# Patient Record
Sex: MALE | Race: WHITE | Employment: FULL TIME | URBAN - METROPOLITAN AREA
[De-identification: names, ages, dates, MRNs, and addresses within clinical notes are randomized per-mention and may not be internally consistent; named-entity substitution may affect disease eponyms.]

---

## 2017-02-27 ENCOUNTER — OFFICE VISIT (OUTPATIENT)
Dept: PODIATRY | Facility: CLINIC | Age: 57
End: 2017-02-27
Payer: COMMERCIAL

## 2017-02-27 DIAGNOSIS — M79.675 PAIN OF TOE OF LEFT FOOT: ICD-10-CM

## 2017-02-27 DIAGNOSIS — B35.1 ONYCHOMYCOSIS: Primary | ICD-10-CM

## 2017-02-27 PROCEDURE — 99213 OFFICE O/P EST LOW 20 MIN: CPT | Mod: 25 | Performed by: PODIATRIST

## 2017-02-27 PROCEDURE — 11750 EXCISION NAIL&NAIL MATRIX: CPT | Mod: TA | Performed by: PODIATRIST

## 2017-02-27 NOTE — PROGRESS NOTES
"PATIENT HISTORY:  Pb Vincent is a 56 year old male who presents to clinic for left hallux toenail pain.  Present for years.  Worse with certain shoes.  4/10 pain.  He is considering removal.  Denies other medial issues.  Works in computer programming.  No other treatments reported.       EXAM:Vitals: Ht 5' 10.75\" (1.797 m)  Wt 226 lb 6.4 oz (102.7 kg)  BMI 31.8 kg/m2  BMI= Body mass index is 31.8 kg/(m^2).    General appearance: Patient is alert and fully cooperative with history & exam.  No sign of distress is noted during the visit.     Dermatologic: Left hallux nail thickened, dystrophic, discolored. No paronychia or evidence of soft tissue infection is noted.     Vascular: DP & PT pulses are intact & regular on the left.  No significant edema or varicosities noted.  CFT and skin temperature are normal.     Neurologic: Lower extremity sensation is intact to light touch.  No evidence of weakness or contracture in the lower extremities.  No evidence of neuropathy.     Musculoskeletal: Patient is ambulatory without assistive device or brace.  No gross ankle deformity noted.  No foot or ankle joint effusion is noted.     ASSESSMENT: Left hallux onychomycosis, pain     PLAN:  Reviewed patient's chart in epic.  Discussed condition and treatment options including pros and cons.    Discussed causes of nail fungus.  Discussed treatment options with patient and explained that there isn't one treatment that is 100% effective.  Debridement is an option.  Discussed oral lamisil which is the most effective at about 70% but can have liver effects.  Discussed over the counter antifungal creams.  Explained that these are less effective and need to be applied twice a day for about 3-4 months.  Also talked about prescription topicals, which have similar efficacy.  Also discussed that if there was damage to the nail and the nail is now dystrophic that none of the above is going to change the nail.  Discussed that if it becomes " painful, we can remove the nail in clinic.  The patient elected to undergo total avulsion, permanent.  Discussed risk of infection, nail regrowth, slow wound healing.    Procedure:  In addition to office visit.  After consent, the left 1st toe was anesthetized with 5cc's of 1% lidocaine plain after alcohol prep. Betadine prep performed.  A tourniquet was applied to the toe. Using sterile instrumentation, the nail plate was raised and avulsed.  Difficult to remove given nail changes.  Three 30 second applications of phenol were applied to the nail bed and nail matrix.  The area was then flushed with copious amounts of alcohol.  Bacitracin was applied to the nail bed.  The tourniquet was removed and a hyperemic response was noted.  Bandage was applied to the toe.  The patient tolerated the procedure and anesthesia well.    Post op instructions provided and discussed with pt.  F/u in 2 wks.        Lamont England DPM, FACFAS

## 2017-02-27 NOTE — MR AVS SNAPSHOT
After Visit Summary   2/27/2017    Pb Vincent    MRN: [id number]           Patient Information     Date Of Birth          1960        Visit Information        Provider Department      2/27/2017 3:00 PM Lamont England DPM [facility]        Today's Diagnoses     Onychomycosis    -  1    Pain of toe of left foot          Care Instructions    TOENAIL POSTOPERATIVE INSTRUCTIONS   (Nail avulsion or chemical matrixectomy)   1 Go directly home and elevate the affected foot on one or two pillows for the remainder of the day/evening. Your toe may stay numb for the next 2-8 hours.   2 Take Tylenol, ibuprofen or another anti-inflammatory as needed for pain.   3 Take antibiotic if that has been prescribed. Take the entire prescribed antibiotic even if your symptoms have improved.   4 Keep dressing dry and intact the day of the procedure. The morning after the procedure, remove entire dressing and soak/wash the affected area in warm water (you may add Epsom salt) for 5 to 10 minutes. Do this twice a day for 2-4 weeks (6-8 weeks if you had phenol) (you may count showering/bathing as one soak).  After soaks, pat the area dry and then allow to airdry for a few minutes. Apply antibiotic ointment to the area and cover with 2 X 2 gauze and paper tape or a band-aid.  5 May walk and pursue everyday activities as tolerated with either an open toe shoe or cut-out shoe as needed. May wear regular shoes if no pain is noted.  6 Watch for any signs and symptoms of infection such as: redness, red streaks going up the foot/leg, swelling, pus or foul odor. Those that have had the phenol procedure, the toe will drain longer and will look like it is infected because it is a chemical burn.  Please call with questions.      NAIL FUNGUS / ONYCHOMYCOSIS   Nail fungus is not a hygiene problem and will not likely lead to significant medical   problems. The nails may get thick causing pain and possibly  local skin infection.   Treatments include debridement (trimming), oral antifungals, topical antifungals and complete removal of the nail. Most fungal nails are not treated.   Topicals such as tea tree oil can be helpful for surface fungus and may, at best, limit   progression. Over the counter creams (such as Lamisil) can also be used however, their effectiveness is also quite low.  Topical treatment with Pen lac is expensive and often not covered by insurance. Pen lac has an approximate 8% success rate. Topical therapy recommendations is to apply twice a day for at least 3-4 months as it takes 9 months for new nail to grow out.    Experts suggest soaking your feet for 15 to 20 minutes in a mixture of 1 cup vinegar to 4 cups warm water. Be sure to rinse well and pat your feet dry when you're done. You can soak your feet like this daily. But if your skin becomes irritated, try soaking only two to three times a week. Vicks VapoRub. As with vinegar, there have been no controlled clinical trials to assess the effectiveness of Vicks VapoRub on nail fungus, but there have been numerous anecdotal reports that it works. There's no consensus on how often to apply this product, so check with your doctor before using it on your nails.     Oral therapies include Sporanox and Lamisil. Oral therapies are also expensive and not very effective. Side effects such as liver disease are the main concern. Return of fungus is common even if the treatment worked.           Follow-ups after your visit        Who to contact     If you have questions or need follow up information about today's clinic visit or your schedule please contact [facility] directly at [phone].  Normal or non-critical lab and imaging results will be communicated to you by MyChart, letter or phone within 4 business days after the clinic has received the results. If you do not hear from us within 7 days, please contact the clinic through  "MyChart or phone. If you have a critical or abnormal lab result, we will notify you by phone as soon as possible.  Submit refill requests through [employer] or call your pharmacy and they will forward the refill request to us. Please allow 3 business days for your refill to be completed.          Additional Information About Your Visit        [employer] Information     [employer] gives you secure access to your electronic health record. If you see a primary care provider, you can also send messages to your care team and make appointments. If you have questions, please call your primary care clinic.  If you do not have a primary care provider, please call [phone] and they will assist you.        Care EveryWhere ID     This is your Care EveryWhere ID. This could be used by other organizations to access your [place] medical records  [id number]        Your Vitals Were     Height BMI (Body Mass Index)                5' 10.75\" (1.797 m) 31.8 kg/m2           Blood Pressure from Last 3 Encounters:   06/14/16 129/82   03/23/16 117/76   06/12/15 134/82    Weight from Last 3 Encounters:   02/27/17 226 lb 6.4 oz (102.7 kg)   06/14/16 233 lb (105.7 kg)   03/23/16 228 lb (103.4 kg)              Today, you had the following     No orders found for display       Primary Care Provider Office Phone # Fax #    Jj Monreal [phone]       [address]  [facility] [address]        Thank you!     Thank you for choosing [facility]  for your care. Our goal is always to provide you with excellent care. Hearing back from our patients is one way we can continue to improve our services. Please take a few minutes to complete the written survey that you may receive in the mail after your visit with us. Thank you!             Your Updated Medication List - Protect others around you: Learn how to safely use, store and throw away your medicines at www.disposemymeds.org.          This " list is accurate as of: 2/27/17  3:48 PM.  Always use your most recent med list.                   Brand Name Dispense Instructions for use    acyclovir 800 MG tablet    ZOVIRAX    50 tablet    Take 1 tablet (800 mg) by mouth 5 times daily for 10 days       ASPIRIN EC PO      Unknown dose       PARoxetine 20 MG tablet    PAXIL    90 tablet    Take 1 tablet (20 mg) by mouth daily

## 2017-02-27 NOTE — PATIENT INSTRUCTIONS
TOENAIL POSTOPERATIVE INSTRUCTIONS   (Nail avulsion or chemical matrixectomy)   1 Go directly home and elevate the affected foot on one or two pillows for the remainder of the day/evening. Your toe may stay numb for the next 2-8 hours.   2 Take Tylenol, ibuprofen or another anti-inflammatory as needed for pain.   3 Take antibiotic if that has been prescribed. Take the entire prescribed antibiotic even if your symptoms have improved.   4 Keep dressing dry and intact the day of the procedure. The morning after the procedure, remove entire dressing and soak/wash the affected area in warm water (you may add Epsom salt) for 5 to 10 minutes. Do this twice a day for 2-4 weeks (6-8 weeks if you had phenol) (you may count showering/bathing as one soak).  After soaks, pat the area dry and then allow to airdry for a few minutes. Apply antibiotic ointment to the area and cover with 2 X 2 gauze and paper tape or a band-aid.  5 May walk and pursue everyday activities as tolerated with either an open toe shoe or cut-out shoe as needed. May wear regular shoes if no pain is noted.  6 Watch for any signs and symptoms of infection such as: redness, red streaks going up the foot/leg, swelling, pus or foul odor. Those that have had the phenol procedure, the toe will drain longer and will look like it is infected because it is a chemical burn.  Please call with questions.      NAIL FUNGUS / ONYCHOMYCOSIS   Nail fungus is not a hygiene problem and will not likely lead to significant medical   problems. The nails may get thick causing pain and possibly local skin infection.   Treatments include debridement (trimming), oral antifungals, topical antifungals and complete removal of the nail. Most fungal nails are not treated.   Topicals such as tea tree oil can be helpful for surface fungus and may, at best, limit   progression. Over the counter creams (such as Lamisil) can also be used however, their effectiveness is also quite low.  Topical  treatment with Pen lac is expensive and often not covered by insurance. Pen lac has an approximate 8% success rate. Topical therapy recommendations is to apply twice a day for at least 3-4 months as it takes 9 months for new nail to grow out.    Experts suggest soaking your feet for 15 to 20 minutes in a mixture of 1 cup vinegar to 4 cups warm water. Be sure to rinse well and pat your feet dry when you're done. You can soak your feet like this daily. But if your skin becomes irritated, try soaking only two to three times a week. Vicks VapoRub. As with vinegar, there have been no controlled clinical trials to assess the effectiveness of Vicks VapoRub on nail fungus, but there have been numerous anecdotal reports that it works. There's no consensus on how often to apply this product, so check with your doctor before using it on your nails.     Oral therapies include Sporanox and Lamisil. Oral therapies are also expensive and not very effective. Side effects such as liver disease are the main concern. Return of fungus is common even if the treatment worked.

## 2017-02-28 VITALS
WEIGHT: 226.4 LBS | DIASTOLIC BLOOD PRESSURE: 82 MMHG | BODY MASS INDEX: 31.69 KG/M2 | SYSTOLIC BLOOD PRESSURE: 131 MMHG | HEIGHT: 71 IN

## 2017-02-28 NOTE — NURSING NOTE
"Chief Complaint   Patient presents with     Ingrown Toenail     L 1st toenail ingrown       Initial /82 (BP Location: Right arm, Patient Position: Chair, Cuff Size: Adult Large)  Ht 5' 10.75\" (1.797 m)  Wt 226 lb 6.4 oz (102.7 kg)  BMI 31.8 kg/m2 Estimated body mass index is 31.8 kg/(m^2) as calculated from the following:    Height as of this encounter: 5' 10.75\" (1.797 m).    Weight as of this encounter: 226 lb 6.4 oz (102.7 kg).  Medication Reconciliation: boni Salgado MA February 28, 2017 10:28 AM      "

## 2017-02-28 NOTE — PROGRESS NOTES
Weight management plan: Patient was referred to their PCP to discuss a diet and exercise plan.     MORGAN Salgado MA February 28, 2017 10:28 AM

## 2017-07-21 ENCOUNTER — OFFICE VISIT (OUTPATIENT)
Dept: FAMILY MEDICINE | Facility: CLINIC | Age: 57
End: 2017-07-21
Payer: COMMERCIAL

## 2017-07-21 VITALS
DIASTOLIC BLOOD PRESSURE: 78 MMHG | HEART RATE: 77 BPM | OXYGEN SATURATION: 98 % | RESPIRATION RATE: 18 BRPM | WEIGHT: 225 LBS | TEMPERATURE: 96.5 F | SYSTOLIC BLOOD PRESSURE: 127 MMHG | BODY MASS INDEX: 31.6 KG/M2

## 2017-07-21 DIAGNOSIS — Z12.5 SPECIAL SCREENING FOR MALIGNANT NEOPLASM OF PROSTATE: ICD-10-CM

## 2017-07-21 DIAGNOSIS — F41.9 ANXIETY: ICD-10-CM

## 2017-07-21 DIAGNOSIS — R73.03 PREDIABETES: ICD-10-CM

## 2017-07-21 DIAGNOSIS — Z00.00 ROUTINE GENERAL MEDICAL EXAMINATION AT A HEALTH CARE FACILITY: Primary | ICD-10-CM

## 2017-07-21 LAB
CHOLEST SERPL-MCNC: 172 MG/DL
GLUCOSE SERPL-MCNC: 96 MG/DL (ref 70–99)
HDLC SERPL-MCNC: 32 MG/DL
LDLC SERPL CALC-MCNC: 114 MG/DL
NONHDLC SERPL-MCNC: 140 MG/DL
PSA SERPL-ACNC: 1.54 UG/L (ref 0–4)
TRIGL SERPL-MCNC: 130 MG/DL

## 2017-07-21 PROCEDURE — 90471 IMMUNIZATION ADMIN: CPT | Performed by: FAMILY MEDICINE

## 2017-07-21 PROCEDURE — 36415 COLL VENOUS BLD VENIPUNCTURE: CPT | Performed by: FAMILY MEDICINE

## 2017-07-21 PROCEDURE — 82947 ASSAY GLUCOSE BLOOD QUANT: CPT | Performed by: FAMILY MEDICINE

## 2017-07-21 PROCEDURE — 80061 LIPID PANEL: CPT | Performed by: FAMILY MEDICINE

## 2017-07-21 PROCEDURE — G0103 PSA SCREENING: HCPCS | Performed by: FAMILY MEDICINE

## 2017-07-21 PROCEDURE — 99396 PREV VISIT EST AGE 40-64: CPT | Mod: 25 | Performed by: FAMILY MEDICINE

## 2017-07-21 PROCEDURE — 90715 TDAP VACCINE 7 YRS/> IM: CPT | Performed by: FAMILY MEDICINE

## 2017-07-21 RX ORDER — PAROXETINE 20 MG/1
20 TABLET, FILM COATED ORAL DAILY
Qty: 90 TABLET | Refills: 3 | Status: SHIPPED | OUTPATIENT
Start: 2017-07-21 | End: 2018-05-18

## 2017-07-21 NOTE — PROGRESS NOTES
SUBJECTIVE:   CC: Pb Vincent is an 56 year old male who presents for preventative health visit.     Physical   Annual:     Getting at least 3 servings of Calcium per day::  Yes    Bi-annual eye exam::  Yes    Dental care twice a year::  Yes    Sleep apnea or symptoms of sleep apnea::  None    Diet::  Regular (no restrictions)    Taking medications regularly::  Yes    Medication side effects::  None    Additional concerns today::  No    Today's PHQ-2 Score:   PHQ-2 ( 1999 [employer]) 7/21/2017   Q1: Little interest or pleasure in doing things 0   Q2: Feeling down, depressed or hopeless 0   PHQ-2 Score 0   Q1: Little interest or pleasure in doing things Not at all   Q2: Feeling down, depressed or hopeless Not at all   PHQ-2 Score 0       Abuse: Current or Past(Physical, Sexual or Emotional)- No  Do you feel safe in your environment - Yes    Social History   Substance Use Topics     Smoking status: Never Smoker     Smokeless tobacco: Never Used     Alcohol use No      Comment: beer      The patient does not drink >3 drinks per day nor >7 drinks per week.    Last PSA:   PSA   Date Value Ref Range Status   12/30/2010 1.07 0 - 4 ug/L Final       Reviewed orders with patient. Reviewed health maintenance and updated orders accordingly - Yes  Labs reviewed in EPIC    Reviewed and updated as needed this visit by clinical staff  Tobacco  Allergies  Meds  Med Hx  Surg Hx  Fam Hx  Soc Hx        Reviewed and updated as needed this visit by Provider  Meds              ROS:  C: NEGATIVE for fever, chills, change in weight  I: NEGATIVE for worrisome rashes, moles or lesions  E: NEGATIVE for vision changes or irritation  ENT: NEGATIVE for ear, mouth and throat problems  R: NEGATIVE for significant cough or SOB  CV: NEGATIVE for chest pain, palpitations or peripheral edema  GI: NEGATIVE for nausea, abdominal pain, heartburn, or change in bowel habits   male: negative for dysuria, hematuria, decreased urinary stream,  erectile dysfunction, urethral discharge  M: NEGATIVE for significant arthralgias or myalgia  N: NEGATIVE for weakness, dizziness or paresthesias  P: NEGATIVE for changes in mood or affect    OBJECTIVE:   /78 (BP Location: Right arm, Patient Position: Sitting, Cuff Size: Adult Large)  Pulse 77  Temp 96.5  F (35.8  C) (Tympanic)  Resp 18  Wt 225 lb (102.1 kg)  SpO2 98%  BMI 31.6 kg/m2    EXAM:  GENERAL: healthy, alert and no distress  EYES: Eyes grossly normal to inspection, PERRL and conjunctivae and sclerae normal  HENT: ear canals and TM's normal, nose and mouth without ulcers or lesions  NECK: no adenopathy, no asymmetry, masses, or scars and thyroid normal to palpation  RESP: lungs clear to auscultation - no rales, rhonchi or wheezes  CV: regular rate and rhythm, normal S1 S2, no S3 or S4, no murmur, click or rub, no peripheral edema and peripheral pulses strong  ABDOMEN: soft, nontender, no hepatosplenomegaly, no masses and bowel sounds normal  RECTAL: normal sphincter tone, no rectal masses, prostate normal size, smooth, nontender without nodules or masses  MS: no gross musculoskeletal defects noted, no edema  SKIN: no suspicious lesions or rashes  NEURO: Normal strength and tone, mentation intact and speech normal  PSYCH: mentation appears normal, affect normal/bright    ASSESSMENT/PLAN:       ICD-10-CM    1. Routine general medical examination at a health care facility Z00.00 TDAP VACCINE (ADACEL)     Lipid panel reflex to direct LDL     Glucose     JUST IN CASE   2. Anxiety F41.9 PARoxetine (PAXIL) 20 MG tablet   3. Prediabetes R73.03    4. Special screening for malignant neoplasm of prostate Z12.5 PSA, screen       COUNSELING:   Reviewed preventive health counseling, as reflected in patient instructions       Regular exercise       Immunizations    Vaccinated for: TDAP           Consider Hep C screening for patients born between 1945 and 1965      BP Screening:   Last 3 BP Readings:    BP  "Readings from Last 3 Encounters:   07/21/17 127/78   02/27/17 131/82   06/14/16 129/82       The following was recommended to the patient:  Re-screen BP within a year and recommended lifestyle modifications       reports that he has never smoked. He has never used smokeless tobacco.      Estimated body mass index is 31.6 kg/(m^2) as calculated from the following:    Height as of 2/27/17: 5' 10.75\" (1.797 m).    Weight as of this encounter: 225 lb (102.1 kg).   Weight management plan: Discussed healthy diet and exercise guidelines and patient will follow up in 12 months in clinic to re-evaluate.    Counseling Resources:  ATP IV Guidelines  Pooled Cohorts Equation Calculator  FRAX Risk Assessment  ICSI Preventive Guidelines  Dietary Guidelines for Americans, 2010  USDA's MyPlate  ASA Prophylaxis  Lung CA Screening    Jj Monreal MD  [facility]  Answers for HPI/ROS submitted by the patient on 7/21/2017   PHQ-2 Score: 0    "

## 2017-07-21 NOTE — NURSING NOTE
Screening Questionnaire for Adult Immunization     Are you sick today?   No    Do you have allergies to medications, food or any vaccine?   Yes    Have you ever had a serious reaction after receiving a vaccination?   No    Do you have a long-term health problem with heart disease, lung disease,  asthma, kidney disease, diabetes, anemia, metabolic or blood disease?   No    Do you have cancer, leukemia, AIDS, or any immune system problem?   No    Do you take cortisone, prednisone, other steroids, or anticancer drugs, or  have you had any x-ray (radiation) treatments?   No    Have you had a seizure, brain, or other nervous system problem?   No    During the past year, have you received a transfusion of blood or blood       products, or been given a medicine called immune (gamma) globulin?   No    For women: Are you pregnant or is there a chance you could become         pregnant during the next month?   No    Have you received any vaccinations in the past 4 weeks?   No     Immunization questionnaire was positive for at least one answer.  Notified Dr. Monreal.      [facility] doesn't apply on this patient     Screening performed by Elaina Padilla on 7/21/2017 at 8:15 AM.  Per orders of Dr. Monreal, injection(s) of TDAP given by Elaina Padilla. Patient instructed to remain in clinic for 20 minutes afterwards, and to report any adverse reaction to me immediately

## 2017-07-21 NOTE — MR AVS SNAPSHOT
After Visit Summary   7/21/2017    Pb Vincent    MRN: [id number]           Patient Information     Date Of Birth          1960        Visit Information        Provider Department      7/21/2017 7:40 AM Jj Monreal MD [facility]        Today's Diagnoses     Routine general medical examination at a health care facility    -  1    Anxiety        Prediabetes        Special screening for malignant neoplasm of prostate          Care Instructions      Preventive Health Recommendations  Male Ages 50 - 64    Yearly exam:             See your health care provider every year in order to  o   Review health changes.   o   Discuss preventive care.    o   Review your medicines if your doctor has prescribed any.     Have a cholesterol test every 5 years, or more frequently if you are at risk for high cholesterol/heart disease.     Have a diabetes test (fasting glucose) every three years. If you are at risk for diabetes, you should have this test more often.     Have a colonoscopy at age 50, or have a yearly FIT test (stool test). These exams will check for colon cancer.      Talk with your health care provider about whether or not a prostate cancer screening test (PSA) is right for you.    You should be tested each year for STDs (sexually transmitted diseases), if you re at risk.     Shots: Get a flu shot each year. Get a tetanus shot every 10 years.     Nutrition:    Eat at least 5 servings of fruits and vegetables daily.     Eat whole-grain bread, whole-wheat pasta and brown rice instead of white grains and rice.     Talk to your provider about Calcium and Vitamin D.     Lifestyle    Exercise for at least 150 minutes a week (30 minutes a day, 5 days a week). This will help you control your weight and prevent disease.     Limit alcohol to one drink per day.     No smoking.     Wear sunscreen to prevent skin cancer.     See your dentist every six months for an exam and cleaning.      See your eye doctor every 1 to 2 years.            Follow-ups after your visit        Who to contact     If you have questions or need follow up information about today's clinic visit or your schedule please contact [facility] directly at [phone].  Normal or non-critical lab and imaging results will be communicated to you by MyChart, letter or phone within 4 business days after the clinic has received the results. If you do not hear from us within 7 days, please contact the clinic through [employer] or phone. If you have a critical or abnormal lab result, we will notify you by phone as soon as possible.  Submit refill requests through [employer] or call your pharmacy and they will forward the refill request to us. Please allow 3 business days for your refill to be completed.          Additional Information About Your Visit        [employer] Information     [employer]ushaper gives you secure access to your electronic health record. If you see a primary care provider, you can also send messages to your care team and make appointments. If you have questions, please call your primary care clinic.  If you do not have a primary care provider, please call [phone] and they will assist you.        Care EveryWhere ID     This is your Care EveryWhere ID. This could be used by other organizations to access your [facility] medical records  [id number]        Your Vitals Were     Pulse Temperature Respirations Pulse Oximetry BMI (Body Mass Index)       77 96.5  F (35.8  C) (Tympanic) 18 98% 31.6 kg/m2        Blood Pressure from Last 3 Encounters:   07/21/17 127/78   02/27/17 131/82   06/14/16 129/82    Weight from Last 3 Encounters:   07/21/17 225 lb (102.1 kg)   02/27/17 226 lb 6.4 oz (102.7 kg)   06/14/16 233 lb (105.7 kg)              We Performed the Following     Glucose     JUST IN CASE     Lipid panel reflex to direct LDL     PSA, screen     TDAP VACCINE (ADACEL)          Today's Medication Changes           These changes are accurate as of: 7/21/17  7:56 AM.  If you have any questions, ask your nurse or doctor.               These medicines have changed or have updated prescriptions.        Dose/Directions    PARoxetine 20 MG tablet   Commonly known as:  PAXIL   This may have changed:  See the new instructions.   Used for:  Anxiety   Changed by:  Jj Monreal MD        Dose:  20 mg   Take 1 tablet (20 mg) by mouth daily   Quantity:  90 tablet   Refills:  3            Where to get your medicines      [place] [facility] [place] [address], [place] [address]     Phone:  [phone]     PARoxetine 20 MG tablet                Primary Care Provider Office Phone # Fax #    Jj Monreal [phone]       [facility] [address]  [facility] [address]        Equal Access to Services     [facility]: Hadii aad ku hadasho Soomaali, waaxda luqadaha, qaybta kaalmada adeegyada, waxay idiin hayaan adekeith kharadevika figueroa . So [facility] [phone].    ATENCIÓN: Si habla español, tiene a pineda disposición servicios gratuitos de asistencia lingüística. Llame al [phone].    We comply with applicable federal civil rights laws and [place] laws. We do not discriminate on the basis of race, color, national origin, age, disability sex, sexual orientation or gender identity.            Thank you!     Thank you for choosing [facility]  for your care. Our goal is always to provide you with excellent care. Hearing back from our patients is one way we can continue to improve our services. Please take a few minutes to complete the written survey that you may receive in the mail after your visit with us. Thank you!             Your Updated Medication List - Protect others around you: Learn how to safely use, store and throw away your medicines at www.disposemymeds.org.          This list is accurate as of: 7/21/17   7:56 AM.  Always use your most recent med list.                   Brand Name Dispense Instructions for use Diagnosis    acyclovir 800 MG tablet    ZOVIRAX    50 tablet    Take 1 tablet (800 mg) by mouth 5 times daily for 10 days    Herpes zoster without complication       ASPIRIN EC PO      Unknown dose        PARoxetine 20 MG tablet    PAXIL    90 tablet    Take 1 tablet (20 mg) by mouth daily    Anxiety

## 2017-07-21 NOTE — NURSING NOTE
"Chief Complaint   Patient presents with     Physical       Initial /78 (BP Location: Right arm, Patient Position: Sitting, Cuff Size: Adult Large)  Pulse 77  Temp 96.5  F (35.8  C) (Tympanic)  Resp 18  Wt 225 lb (102.1 kg)  SpO2 98%  BMI 31.6 kg/m2 Estimated body mass index is 31.6 kg/(m^2) as calculated from the following:    Height as of 2/27/17: 5' 10.75\" (1.797 m).    Weight as of this encounter: 225 lb (102.1 kg).  Medication Reconciliation: unable or not appropriate to perform         Elaina Padilla MA      "

## 2018-04-06 ENCOUNTER — TELEPHONE (OUTPATIENT)
Dept: FAMILY MEDICINE | Facility: CLINIC | Age: 58
End: 2018-04-06

## 2018-04-06 NOTE — TELEPHONE ENCOUNTER
"Pb Vincent is a 57 year old male who calls with hip problem.    NURSING ASSESSMENT:  Description:  Noticed problem in hip - feels like \"its not bending right or hinging correctly - hip out of joint\" - starting to increase in frequency - he has a physical scheduled 4/17 - is requesting a referral to orthopedics  Onset/duration:  3 weeks ago  Precip. factors:  unknown    Associated symptoms:    1. Right Hip - Pain 1/10 at rest - worsens with some positions - sharp to 6-7/10  - increasing in frequency to daily - occurs when moving or changing position from sitting to standing intermittently  2. Tingling feeling - in the hip - does radiate down leg \"a little bit\"  3. Denies, change in bowel, bladder, cold limb, loss of feeling/circulation, recent injury work/exercise, inability to bear weight, bruising, redness, swelling, drainage, dislocation, the problem is not causing severe inability for him to complete ADL's or function with daily activities and work,      Last exam/Treatment:  7/17  Allergies:   Allergies   Allergen Reactions     Chlorhexidine Gluconate      Blisters skin, use alchol for IVs     Feathers      Penicillins      Wool Fiber          NURSING PLAN: Nursing advice to patient Schedule sooner office visit with Dr. Monreal - last physical 7/2017 - patient is not due - cancelled appointment - discussed home care - rest, ice/heat, tylenol/ibuprofen - patient verbalized understanding - no further questions at this time    RECOMMENDED DISPOSITION:  See within 2 weeks - see above  Will comply with recommendation: Yes  If further questions/concerns or if symptoms do not improve, worsen or new symptoms develop, call your PCP or [facility] as soon as possible.      Guideline used:  Telephone Triage Protocols for Nurses, Fifth Edition, Su Hill RN    "

## 2018-04-10 ENCOUNTER — OFFICE VISIT (OUTPATIENT)
Dept: FAMILY MEDICINE | Facility: CLINIC | Age: 58
End: 2018-04-10
Payer: COMMERCIAL

## 2018-04-10 VITALS
TEMPERATURE: 97.6 F | DIASTOLIC BLOOD PRESSURE: 81 MMHG | BODY MASS INDEX: 34.13 KG/M2 | SYSTOLIC BLOOD PRESSURE: 125 MMHG | HEART RATE: 86 BPM | RESPIRATION RATE: 20 BRPM | WEIGHT: 243 LBS

## 2018-04-10 DIAGNOSIS — M25.551 HIP PAIN, RIGHT: Primary | ICD-10-CM

## 2018-04-10 PROCEDURE — 99213 OFFICE O/P EST LOW 20 MIN: CPT | Performed by: FAMILY MEDICINE

## 2018-04-10 NOTE — PROGRESS NOTES
"  SUBJECTIVE:   Pb Vincent is a 57 year old male with a past medical history of obesity, anxiety, and prediabetes who presents to clinic today for a 3-4 week history of right hip symptoms. He first experienced right hip pain with getting up from chairs. He describes the event as a hip twisting and collapsing when this happens. The pain localizes to the lateral hip and slightly posterior to the edges of the buttock tendons. It does not radiate anywhere and only lasts for roughly 20 seconds before disappearing. This started occurring every few days 4 weeks ago, and then last week it was happening once every day. However, ever since 5 days ago, he stopped experiencing these \"collapsing\" events. Outside of this, he feels his right hip has felt \"raw\" and \"stiff\". He hasn't tired any medications or icing/heating to help alleviate his symptoms. He denies any prior trauma, recent sensation, numbness, tingling, or weakness. He denies groin or inguinal pain.     Joint Pain    Onset: several weeks     Description:   Location: right hip  Character: raw    Intensity: moderate    Progression of Symptoms: intermittent    Accompanying Signs & Symptoms:  Other symptoms: weakness    History:   Previous similar pain: no       Precipitating factors:   Trauma or overuse: no     Alleviating factors:  Improved by: changing positions     Therapies Tried and outcome: none    Review Of Systems  Skin: negative  Eyes: negative  Ears/Nose/Throat: negative  Respiratory: No shortness of breath, dyspnea on exertion, cough, or hemoptysis  Cardiovascular: negative  Gastrointestinal: negative  Genitourinary: negative  Musculoskeletal: \"collapsing\" hip events with standing up; a couple days ago right knuckle pain lasting 24 hours (resolved)  Neurologic: no numbness, tingling, or weakness    Objective:  /81  Pulse 86  Temp 97.6  F (36.4  C) (Oral)  Resp 20  Wt 243 lb (110.2 kg)  BMI 34.13 kg/m2    PE  General: well-appearing, not in " "acute distress, conversing appropriately   CV: RRR, no mr/r/g, bilateral dorsalis pedis palpable   Pulm: CTAB posteriorly and anteriorly   Abdomen: normoactive bowel sounds without tenderness to palpation; no masses palpated  MSK/Neuro: No evidence of any skin lesions or bruising appreciated; No tenderness to palpation at hip and along the IT band; normal ROM at knee and hip; 5/5 motor strength with dorsiflexion, plantarflexion, knee extension & flexion, hip extension and flexion; \"rawness\" reproducible with internal rotation and flexion of R. Hip; Otherwise hip exam was normal; negative log roll test, negative julia test; Heel & toe walking was performed without difficulty or pain;   Neuro: bilateral plantar and patellar reflexes intact    A/P:  #Right Hip Symptoms: Patient presents with right hip \"collapsing\" and subsequent pain that seem to come about when getting up from a chair and using hip to shift body. These incidents have caused this persistent sensation of \"rawness\" and \"awareness\" that seems to bother patient but have improved over the past 5 days. He has not had any more of these events since then as well. He is concerned for osteoarthritis or hip displacement. Differential does include Osteoarthritis but also includes musculoskeletal strain. unlikely to be  avascular necrosis. Given the time frame, improvement of symptoms, and lack of risk factors, avascular necrosis seems unlikely. Musculoskeletal and osteoarthritis are more likely. Reassured patient that hip dislocation is unlikely and more often this sense of giving way associated with trauma or pain. Discussed the potential option of Xray; however, patient questioned whether Xray was necessary since patient did not want to pay for any services today. After further discussion, it was decided that patient will continue to watch and if symptoms worsen will return for a xray for work up of the hip joint.   "

## 2018-04-10 NOTE — MR AVS SNAPSHOT
After Visit Summary   4/10/2018    Pb Vincent    MRN: [id number]           Patient Information     Date Of Birth          1960        Visit Information        Provider Department      4/10/2018 1:40 PM Jj Monreal MD [facility]        Today's Diagnoses     Hip pain, right    -  1       Follow-ups after your visit        Who to contact     If you have questions or need follow up information about today's clinic visit or your schedule please contact [facility] directly at [phone].  Normal or non-critical lab and imaging results will be communicated to you by [employer], letter or phone within 4 business days after the clinic has received the results. If you do not hear from us within 7 days, please contact the clinic through [employer] or phone. If you have a critical or abnormal lab result, we will notify you by phone as soon as possible.  Submit refill requests through [employer] or call your pharmacy and they will forward the refill request to us. Please allow 3 business days for your refill to be completed.          Additional Information About Your Visit        MyChart Information     [employer] gives you secure access to your electronic health record. If you see a primary care provider, you can also send messages to your care team and make appointments. If you have questions, please call your primary care clinic.  If you do not have a primary care provider, please call [phone] and they will assist you.        Care EveryWhere ID     This is your Care EveryWhere ID. This could be used by other organizations to access your [place] medical records  [id number]        Your Vitals Were     Pulse Temperature Respirations BMI (Body Mass Index)          86 97.6  F (36.4  C) (Oral) 20 34.13 kg/m2         Blood Pressure from Last 3 Encounters:   04/10/18 125/81   07/21/17 127/78   02/27/17 131/82    Weight from Last 3 Encounters:   04/10/18 243 lb  (110.2 kg)   07/21/17 225 lb (102.1 kg)   02/27/17 226 lb 6.4 oz (102.7 kg)              Today, you had the following     No orders found for display         Today's Medication Changes          These changes are accurate as of 4/10/18  5:25 PM.  If you have any questions, ask your nurse or doctor.               Stop taking these medicines if you haven't already. Please contact your care team if you have questions.     acyclovir 800 MG tablet   Commonly known as:  ZOVIRAX   Stopped by:  Jj Monreal MD                    Primary Care Provider Office Phone # Fax #    Jj Monreal [phone] [address]  [facility] [address]        Equal Access to Services     [facility] : Hadii dee hu hadbryno Soabril, waaxda luqadaha, qaybta kaalmada adeegyada, geneva figueroa . So [facility] [phone].    ATENCIÓN: Si habla español, tiene a pineda disposición servicios gratuitos de asistencia lingüística. [facility] [phone].    We comply with applicable federal civil rights laws and [place] laws. We do not discriminate on the basis of race, color, national origin, age, disability, sex, sexual orientation, or gender identity.            Thank you!     Thank you for choosing [facility]  for your care. Our goal is always to provide you with excellent care. Hearing back from our patients is one way we can continue to improve our services. Please take a few minutes to complete the written survey that you may receive in the mail after your visit with us. Thank you!             Your Updated Medication List - Protect others around you: Learn how to safely use, store and throw away your medicines at www.disposemymeds.org.          This list is accurate as of 4/10/18  5:25 PM.  Always use your most recent med list.                   Brand Name Dispense Instructions for use Diagnosis    ASPIRIN EC PO      Take 81 mg by mouth Unknown dose        PARoxetine 20 MG tablet     PAXIL    90 tablet    Take 1 tablet (20 mg) by mouth daily    Anxiety

## 2018-07-31 ENCOUNTER — OFFICE VISIT (OUTPATIENT)
Dept: FAMILY MEDICINE | Facility: CLINIC | Age: 58
End: 2018-07-31
Payer: COMMERCIAL

## 2018-07-31 VITALS
RESPIRATION RATE: 16 BRPM | WEIGHT: 230.38 LBS | OXYGEN SATURATION: 100 % | BODY MASS INDEX: 32.25 KG/M2 | HEART RATE: 79 BPM | TEMPERATURE: 97.7 F | HEIGHT: 71 IN

## 2018-07-31 DIAGNOSIS — Z00.00 ROUTINE GENERAL MEDICAL EXAMINATION AT A HEALTH CARE FACILITY: Primary | ICD-10-CM

## 2018-07-31 DIAGNOSIS — Z23 ENCOUNTER FOR IMMUNIZATION: ICD-10-CM

## 2018-07-31 DIAGNOSIS — F41.9 ANXIETY: ICD-10-CM

## 2018-07-31 DIAGNOSIS — D23.5 BENIGN NEOPLASM OF SKIN OF TRUNK, EXCEPT SCROTUM: ICD-10-CM

## 2018-07-31 DIAGNOSIS — R73.03 PREDIABETES: ICD-10-CM

## 2018-07-31 LAB
ALBUMIN SERPL-MCNC: 3.9 G/DL (ref 3.4–5)
ALP SERPL-CCNC: 87 U/L (ref 40–150)
ALT SERPL W P-5'-P-CCNC: 35 U/L (ref 0–70)
ANION GAP SERPL CALCULATED.3IONS-SCNC: 6 MMOL/L (ref 3–14)
AST SERPL W P-5'-P-CCNC: 18 U/L (ref 0–45)
BILIRUB SERPL-MCNC: 0.4 MG/DL (ref 0.2–1.3)
BUN SERPL-MCNC: 18 MG/DL (ref 7–30)
CALCIUM SERPL-MCNC: 8.5 MG/DL (ref 8.5–10.1)
CHLORIDE SERPL-SCNC: 105 MMOL/L (ref 94–109)
CHOLEST SERPL-MCNC: 176 MG/DL
CO2 SERPL-SCNC: 28 MMOL/L (ref 20–32)
CREAT SERPL-MCNC: 1 MG/DL (ref 0.66–1.25)
GFR SERPL CREATININE-BSD FRML MDRD: 77 ML/MIN/1.7M2
GLUCOSE SERPL-MCNC: 97 MG/DL (ref 70–99)
HBA1C MFR BLD: 5.6 % (ref 0–5.6)
HDLC SERPL-MCNC: 27 MG/DL
LDLC SERPL CALC-MCNC: 93 MG/DL
NONHDLC SERPL-MCNC: 149 MG/DL
POTASSIUM SERPL-SCNC: 4.3 MMOL/L (ref 3.4–5.3)
PROT SERPL-MCNC: 7.7 G/DL (ref 6.8–8.8)
SODIUM SERPL-SCNC: 139 MMOL/L (ref 133–144)
TRIGL SERPL-MCNC: 282 MG/DL

## 2018-07-31 PROCEDURE — 99396 PREV VISIT EST AGE 40-64: CPT | Mod: 25 | Performed by: FAMILY MEDICINE

## 2018-07-31 PROCEDURE — 36415 COLL VENOUS BLD VENIPUNCTURE: CPT | Performed by: FAMILY MEDICINE

## 2018-07-31 PROCEDURE — 99213 OFFICE O/P EST LOW 20 MIN: CPT | Mod: 25 | Performed by: FAMILY MEDICINE

## 2018-07-31 PROCEDURE — 90471 IMMUNIZATION ADMIN: CPT | Performed by: FAMILY MEDICINE

## 2018-07-31 PROCEDURE — 90750 HZV VACC RECOMBINANT IM: CPT | Performed by: FAMILY MEDICINE

## 2018-07-31 PROCEDURE — 80053 COMPREHEN METABOLIC PANEL: CPT | Performed by: FAMILY MEDICINE

## 2018-07-31 PROCEDURE — 83036 HEMOGLOBIN GLYCOSYLATED A1C: CPT | Performed by: FAMILY MEDICINE

## 2018-07-31 PROCEDURE — 80061 LIPID PANEL: CPT | Performed by: FAMILY MEDICINE

## 2018-07-31 RX ORDER — PAROXETINE 20 MG/1
20 TABLET, FILM COATED ORAL DAILY
Qty: 90 TABLET | Refills: 3 | Status: SHIPPED | OUTPATIENT
Start: 2018-07-31 | End: 2020-02-12

## 2018-07-31 NOTE — NURSING NOTE
Screening Questionnaire for Adult Immunization    Are you sick today?   No   Do you have allergies to medications, food, a vaccine component or latex?   Yes   Have you ever had a serious reaction after receiving a vaccination?   No   Do you have a long-term health problem with heart disease, lung disease, asthma, kidney disease, metabolic disease (e.g. diabetes), anemia, or other blood disorder?   No   Do you have cancer, leukemia, HIV/AIDS, or any other immune system problem?   No   In the past 3 months, have you taken medications that affect  your immune system, such as prednisone, other steroids, or anticancer drugs; drugs for the treatment of rheumatoid arthritis, Crohn s disease, or psoriasis; or have you had radiation treatments?   No   Have you had a seizure, or a brain or other nervous system problem?   No   During the past year, have you received a transfusion of blood or blood     products, or been given immune (gamma) globulin or antiviral drug?   No   For women: Are you pregnant or is there a chance you could become        pregnant during the next month?   No   Have you received any vaccinations in the past 4 weeks?   No     Immunization questionnaire was positive for at least one answer.  Notified Dr. Monreal.        Per orders of Dr. Monreal, injection of Shingrix given by Jesús Padilla. Patient instructed to remain in clinic for 15 minutes afterwards, and to report any adverse reaction to me immediately.       Screening performed by Jesús Padilla on 7/31/2018 at 9:13 AM.

## 2018-07-31 NOTE — PROGRESS NOTES
SUBJECTIVE:   CC: Pb Vincent is an 57 year old male who presents for preventative health visit.     Physical   Annual:     Getting at least 3 servings of Calcium per day:  Yes    Bi-annual eye exam:  Yes    Dental care twice a year:  Yes    Sleep apnea or symptoms of sleep apnea:  Sleep apnea    Taking medications regularly:  Yes    Medication side effects:  None    Additional concerns today:  No    Lump left groin. Small lump present for years recently this is bigger. asymptomatic for the most part. But did fel more diffuse abd pain on llq for some time. This has resolved. Not sure if this was related.     -------------------------------------    Today's PHQ-2 Score:   PHQ-2 ( 1999 Pfizer) 7/31/2018   Q1: Little interest or pleasure in doing things 0   Q2: Feeling down, depressed or hopeless 0   PHQ-2 Score 0   Q1: Little interest or pleasure in doing things Not at all   Q2: Feeling down, depressed or hopeless Not at all   PHQ-2 Score 0       Abuse: Current or Past(Physical, Sexual or Emotional)- No  Do you feel safe in your environment - Yes    Social History   Substance Use Topics     Smoking status: Never Smoker     Smokeless tobacco: Never Used     Alcohol use No      Comment: beer      Alcohol Use 7/31/2018   If you drink alcohol do you typically have greater than 3 drinks per day OR greater than 7 drinks per week? Not Applicable       Last PSA:   PSA   Date Value Ref Range Status   07/21/2017 1.54 0 - 4 ug/L Final     Comment:     Assay Method:  Chemiluminescence using Siemens Vista analyzer       Reviewed orders with patient. Reviewed health maintenance and updated orders accordingly - Yes  Labs reviewed in EPIC    Reviewed and updated as needed this visit by clinical staff         Reviewed and updated as needed this visit by Provider            Review of Systems  CONSTITUTIONAL: NEGATIVE for fever, chills, change in weight  INTEGUMENTARY/SKIN: NEGATIVE for worrisome rashes, moles or lesions  EYES:  NEGATIVE for vision changes or irritation  ENT: NEGATIVE for ear, mouth and throat problems  RESP: NEGATIVE for significant cough or SOB  CV: NEGATIVE for chest pain, palpitations or peripheral edema  GI: NEGATIVE for nausea, abdominal pain, heartburn, or change in bowel habits   male: negative for dysuria, hematuria, decreased urinary stream, erectile dysfunction, urethral discharge  MUSCULOSKELETAL: NEGATIVE for significant arthralgias or myalgia  NEURO: NEGATIVE for weakness, dizziness or paresthesias  PSYCHIATRIC: NEGATIVE for changes in mood or affect    OBJECTIVE:   There were no vitals taken for this visit.    Physical Exam  GENERAL: healthy, alert and no distress  EYES: Eyes grossly normal to inspection, PERRL and conjunctivae and sclerae normal  HENT: ear canals and TM's normal, nose and mouth without ulcers or lesions  NECK: no adenopathy, no asymmetry, masses, or scars and thyroid normal to palpation  RESP: lungs clear to auscultation - no rales, rhonchi or wheezes  CV: regular rate and rhythm, normal S1 S2, no S3 or S4, no murmur, click or rub, no peripheral edema and peripheral pulses strong  ABDOMEN: soft, nontender, no hepatosplenomegaly, no masses and bowel sounds normal  MS: no gross musculoskeletal defects noted, no edema  SKIN: no suspicious lesions or rashes. There is a 1x2cm lump left groin sub Q but superficial mobile with central pore. Non tender.   NEURO: Normal strength and tone, mentation intact and speech normal  PSYCH: mentation appears normal, affect normal/bright    Diagnostic Test Results:  none     ASSESSMENT/PLAN:       ICD-10-CM    1. Routine general medical examination at a health care facility Z00.00    2. Anxiety F41.9 PARoxetine (PAXIL) 20 MG tablet     Lipid panel reflex to direct LDL Fasting     Comprehensive metabolic panel (BMP + Alb, Alk Phos, ALT, AST, Total. Bili, TP)   3. Prediabetes R73.03 Lipid panel reflex to direct LDL Fasting     Comprehensive metabolic panel (BMP  "+ Alb, Alk Phos, ALT, AST, Total. Bili, TP)     Hemoglobin A1c   4. Encounter for immunization Z23 ZOSTER VACCINE RECOMBINANT ADJUVANTED IM NJX     VACCINE ADMINISTRATION, INITIAL   5. Benign neoplasm of skin of trunk, except scrotum D23.5 GENERAL SURG ADULT REFERRAL   Discussed skin lesion is likely a epidermoid cyst. Doubt lymph gland. Referred for removal.     COUNSELING:   Reviewed preventive health counseling, as reflected in patient instructions       Regular exercise       Healthy diet/nutrition       Immunizations    Vaccinated for: Zoster             Colon cancer screening       Prostate cancer screening    BP Readings from Last 1 Encounters:   04/10/18 125/81     Estimated body mass index is 32.13 kg/(m^2) as calculated from the following:    Height as of this encounter: 5' 11\" (1.803 m).    Weight as of this encounter: 230 lb 6 oz (104.5 kg).    BP Screening:   Last 3 BP Readings:    BP Readings from Last 3 Encounters:   04/10/18 125/81   07/21/17 127/78   02/27/17 131/82       The following was recommended to the patient:  Re-screen BP within a year and recommended lifestyle modifications  Weight management plan: Discussed healthy diet and exercise guidelines and patient will follow up in 12 months in clinic to re-evaluate.     reports that he has never smoked. He has never used smokeless tobacco.      Counseling Resources:  ATP IV Guidelines  Pooled Cohorts Equation Calculator  FRAX Risk Assessment  ICSI Preventive Guidelines  Dietary Guidelines for Americans, 2010  USDA's MyPlate  ASA Prophylaxis  Lung CA Screening    Jj Monreal MD  [facility]  "

## 2018-07-31 NOTE — MR AVS SNAPSHOT
After Visit Summary   7/31/2018    Pb Vincent    MRN: [id number]           Patient Information     Date Of Birth          1960        Visit Information        Provider Department      7/31/2018 8:40 AM Jj Monreal MD [facility]        Today's Diagnoses     Prediabetes    -  1    Anxiety        Routine general medical examination at a health care facility        Encounter for immunization        Benign neoplasm of skin of trunk, except scrotum          Care Instructions      Preventive Health Recommendations  Male Ages 50 - 64    Yearly exam:             See your health care provider every year in order to  o   Review health changes.   o   Discuss preventive care.    o   Review your medicines if your doctor has prescribed any.     Have a cholesterol test every 5 years, or more frequently if you are at risk for high cholesterol/heart disease.     Have a diabetes test (fasting glucose) every three years. If you are at risk for diabetes, you should have this test more often.     Have a colonoscopy at age 50, or have a yearly FIT test (stool test). These exams will check for colon cancer.      Talk with your health care provider about whether or not a prostate cancer screening test (PSA) is right for you.    You should be tested each year for STDs (sexually transmitted diseases), if you re at risk.     Shots: Get a flu shot each year. Get a tetanus shot every 10 years.     Nutrition:    Eat at least 5 servings of fruits and vegetables daily.     Eat whole-grain bread, whole-wheat pasta and brown rice instead of white grains and rice.     Get adequate Calcium and Vitamin D.     Lifestyle    Exercise for at least 150 minutes a week (30 minutes a day, 5 days a week). This will help you control your weight and prevent disease.     Limit alcohol to one drink per day.     No smoking.     Wear sunscreen to prevent skin cancer.     See your dentist every six months for an exam  and cleaning.     See your eye doctor every 1 to 2 years.            Follow-ups after your visit        Additional Services     GENERAL SURG ADULT REFERRAL       Your provider has referred you to: [facility]: General Surgery Clinic [facility] [phone]   [email or web address]    Please be aware that coverage of these services is subject to the terms and limitations of your health insurance plan.  Call member services at your health plan with any benefit or coverage questions.      Please bring the following with you to your appointment:    (1) Any X-Rays, CTs or MRIs which have been performed.  Contact the facility where they were done to arrange for  prior to your scheduled appointment.   (2) List of current medications   (3) This referral request   (4) Any documents/labs given to you for this referral                  Follow-up notes from your care team     Return for Physical Exam.      Who to contact     If you have questions or need follow up information about today's clinic visit or your schedule please contact [facility] directly at [phone].  Normal or non-critical lab and imaging results will be communicated to you by Shyphart, letter or phone within 4 business days after the clinic has received the results. If you do not hear from us within 7 days, please contact the clinic through Shyphart or phone. If you have a critical or abnormal lab result, we will notify you by phone as soon as possible.  Submit refill requests through [email or web address] or call your pharmacy and they will forward the refill request to us. Please allow 3 business days for your refill to be completed.          Additional Information About Your Visit        Shyphart Information     [email or web address] gives you secure access to your electronic health record. If you see a primary care provider, you can also send messages to your care team and make appointments. If you have questions, please  "call your primary care clinic.  If you do not have a primary care provider, please call [phone] and they will assist you.        Care EveryWhere ID     This is your Care EveryWhere ID. This could be used by other organizations to access your [facility] medical records  [id number]        Your Vitals Were     Pulse Temperature Respirations Height Pulse Oximetry BMI (Body Mass Index)    79 97.7  F (36.5  C) (Oral) 16 5' 11\" (1.803 m) 100% 32.13 kg/m2       Blood Pressure from Last 3 Encounters:   04/10/18 125/81   07/21/17 127/78   02/27/17 131/82    Weight from Last 3 Encounters:   07/31/18 230 lb 6 oz (104.5 kg)   04/10/18 243 lb (110.2 kg)   07/21/17 225 lb (102.1 kg)              We Performed the Following     Comprehensive metabolic panel (BMP + Alb, Alk Phos, ALT, AST, Total. Bili, TP)     GENERAL SURG ADULT REFERRAL     Hemoglobin A1c     Lipid panel reflex to direct LDL Fasting     ZOSTER VACCINE RECOMBINANT ADJUVANTED IM NJX          Today's Medication Changes          These changes are accurate as of 7/31/18  9:03 AM.  If you have any questions, ask your nurse or doctor.               These medicines have changed or have updated prescriptions.        Dose/Directions    PARoxetine 20 MG tablet   Commonly known as:  PAXIL   This may have changed:  See the new instructions.   Used for:  Anxiety   Changed by:  Jj Monreal MD        Dose:  20 mg   Take 1 tablet (20 mg) by mouth daily   Quantity:  90 tablet   Refills:  3            Where to get your medicines      [place] [facility] [place] [address], [place] [address]     Phone:  [phone]     PARoxetine 20 MG tablet                Primary Care Provider Office Phone # Fax #    Jj Monreal [phone]       [facility] [address]        Equal Access to Services     JOZEF THRASHER AH: Hadii aad ku hadasho Soomaali, waaxda luqadaha, qaybta kaalmada " geneva murrellkeith daratray campbellaaamador ah. [facility] [phone].    ATENCIÓN: Si habla himanshu, tiene a pineda disposición servicios gratuitos de asistencia lingüística. Blaze al [phone].    We comply with applicable federal civil rights laws and [place] laws. We do not discriminate on the basis of race, color, national origin, age, disability, sex, sexual orientation, or gender identity.            Thank you!     Thank you for choosing [facility]  for your care. Our goal is always to provide you with excellent care. Hearing back from our patients is one way we can continue to improve our services. Please take a few minutes to complete the written survey that you may receive in the mail after your visit with us. Thank you!             Your Updated Medication List - Protect others around you: Learn how to safely use, store and throw away your medicines at www.disposemymeds.org.          This list is accurate as of 7/31/18  9:03 AM.  Always use your most recent med list.                   Brand Name Dispense Instructions for use Diagnosis    ASPIRIN EC PO      Take 81 mg by mouth Unknown dose        PARoxetine 20 MG tablet    PAXIL    90 tablet    Take 1 tablet (20 mg) by mouth daily    Anxiety

## 2018-08-01 ASSESSMENT — PATIENT HEALTH QUESTIONNAIRE - PHQ9: SUM OF ALL RESPONSES TO PHQ QUESTIONS 1-9: 0

## 2018-10-15 ENCOUNTER — OFFICE VISIT (OUTPATIENT)
Dept: PODIATRY | Facility: CLINIC | Age: 58
End: 2018-10-15
Payer: COMMERCIAL

## 2018-10-15 VITALS
BODY MASS INDEX: 34.3 KG/M2 | HEIGHT: 71 IN | WEIGHT: 245 LBS | SYSTOLIC BLOOD PRESSURE: 140 MMHG | DIASTOLIC BLOOD PRESSURE: 78 MMHG

## 2018-10-15 DIAGNOSIS — L84 CORN OR CALLUS: Primary | ICD-10-CM

## 2018-10-15 DIAGNOSIS — B35.3 TINEA PEDIS OF BOTH FEET: ICD-10-CM

## 2018-10-15 PROCEDURE — 11055 PARING/CUTG B9 HYPRKER LES 1: CPT | Performed by: PODIATRIST

## 2018-10-15 PROCEDURE — 99213 OFFICE O/P EST LOW 20 MIN: CPT | Mod: 25 | Performed by: PODIATRIST

## 2018-10-15 ASSESSMENT — PAIN SCALES - GENERAL: PAINLEVEL: MILD PAIN (3)

## 2018-10-15 NOTE — LETTER
"    10/15/2018         RE: Pb Vincent  [address] [place] [address]        Dear Colleague,    Thank you for referring your patient, Pb Vincent, to the [facility]. Please see a copy of my visit note below.    PATIENT HISTORY:  Pb Vincent is a 57 year old male who presents to clinic for a recurrent corn on L foot.  Also presents with rash to feet.  6 year duration.  3-5/10 pain.  Worse with pressure.  No treatments reported.  Denies fevers, chills, injury.  Nonsmoker.  Pt is working.  Nondiabetic.  Denies related family hx.     EXAM:Vitals: /78  Ht 5' 11\" (1.803 m)  Wt 245 lb (111.1 kg)  BMI 34.17 kg/m2  BMI= Body mass index is 34.17 kg/(m^2).    General appearance: Patient is alert and fully cooperative with history & exam.  No sign of distress is noted during the visit.     Dermatologic: b/l foot with scaling rash in moccasin distribution plantarly.  L lateral 5th MPJ with nucleated painful hyperkeratotic lesion. No paronychia or evidence of bacterial soft tissue infection is noted.  Mycotic thick toenails b/l.      Vascular: DP & PT pulses are intact & regular bilaterally.  No significant edema or varicosities noted.  CFT and skin temperature are normal to both lower extremities.     Neurologic: Lower extremity sensation is intact to light touch.  No evidence of weakness or contracture in the lower extremities.       Musculoskeletal: Mild L tailor's bunion.  Patient is ambulatory without assistive device or brace.  No gross ankle deformity noted.  No foot or ankle joint effusion is noted.     ASSESSMENT:   L foot callus  B/l tinea pedis     PLAN:  Reviewed patient's chart in epic.  Discussed condition and treatment options including pros and cons.    Discussed causes of calluses/keratomas.  They are due to areas of increased friction/pressure.  Discussed treatments such as using foot file, pumice stone, pads, orthotics, and not walking barefoot.  Discussed " underlying bone/mild tailor's bunion as contributory.  Wider shoes advised.  Surgical correction is a last resort.    Advised Lamisil AT cream otc for the athlete's foot.     Pt requested debridement today for the callus.  I debrided the lesion with a 15 blade.    List of routine foot care resources given.    F/u prn.      Lamont England DPM, FACFAS    Weight management plan: Patient was referred to their PCP to discuss a diet and exercise plan.     Patient to follow up with Primary Care provider regarding elevated blood pressure.          Again, thank you for allowing me to participate in the care of your patient.        Sincerely,        Lamont England DPM

## 2018-10-15 NOTE — MR AVS SNAPSHOT
After Visit Summary   10/15/2018    Pb Vincent    MRN: [id number]           Patient Information     Date Of Birth          1960        Visit Information        Provider Department      10/15/2018 10:45 AM Lamont England DPM [facility]        Today's Diagnoses     Corn or callus    -  1    Tinea pedis of both feet          Care Instructions    Try Lamisil AT cream, available over the counter.      Thank you for choosing [facility] / [facility]!    Follow up as needed    DR. ENGLAND'S CLINIC LOCATIONS     MONDAY  [place] TUESDAY & FRIDAY AM  [place]   [address] [facility]   [address]   [place] [address] [place] [id number] [phone]  [phone]       WEDNESDAY  [place] SCHEDULE SURGERY: [phone]   [address] [place] [address] APPOINTMENTS: [phone]   [place] [address] BILLING QUESTIONS: [phone]   [phone]         Calluses, Corns, IPKs, Porokeratosis    When there is excessive friction or pressure on the skin, the body responds by making the skin thicker to protect the deeper structures from becoming exposed.  While this works well to protect the deeper structures, the thickened skin can increase pressure and pain.    Flat, diffuse thickening are simple calluses and they are usually caused by friction.  Often these are the result of rubbing on a shoe or going barefoot.    Calluses with a central core between the toes are called corns.  These result from prominent joints on adjacent toes rubbing together.  Theses are a symptom of bone malalignment and will always recur unless the underlying bones are addressed surgically.    Calluses with a central core on the ball of the foot are usually IPKs (intractable plantar keratosis).  These are caused by excessive pressure from the metatarsals, the bones that make up the ball of the foot.  Often one of these bones  is too long or too prominent.  Again, these will always recur unless the underlying bone issue is addressed.  There is no cure for these.  They will either go away by themselves, recur, or more could develop.    Regardless of the diagnosis, most of these lesions can be kept comfortable with routine maintenance.   1.This consists of filing them with a pumice stone or callus file a couple of times per week.    2. Lotion can be applied to soften the callus. A urea based cream such as Kerasal or Vanicream or thicker cream with shea butter are good options.  3. Toe spacers or toe covers can be used for corns, gel pads can be used for other lesions on the bottom of the foot.   If there is a surgical pathology noted, such as a prominent bone, often this needs to be addressed surgically to minimize recurrence. However, sometimes the lesion simply migrates to another spot after surgery, so it is not a guaranteed cure.     Please call with any additional questions.   ROUTINE FOOT CARE NURSES  [facility]  [phone] [facility]  [phone]   [facility]  [phone]  [facility] [phone]  [facility]  [phone]  [facility] [phone]       Please call one of the above companies for insurance coverage, cost, and location information.        ATHLETE'S FOOT (TINEA PEDIS)  It is a rash that is caused by a fungus (most commonly Dermatophytes) in the outer layer of skin on the foot or in the nails. It can occur between the toes or on the instep and heel areas of the feet. Fungus will grow in warm and moist environments. The fungus that causes athlete's foot is contagious and you can get it from touching someone who has it of walking around bare foot around swimming pools, gyms, saunas, communal baths and showers, fitness centers or locker rooms.     SYMPTOMS  The symptoms of athlete's foot are numerous and  include; itching, burning or stinging between the toes or on the soles of the feet, blisters, cracked and peeling skin, excessive dryness or excessive scaling on the bottom or sides of the feet, redness and softness with breaking down of the skin, especially between the toes, foot odor and thick, crumbly nails. Older males or people who live in warm humid environments are more prone to get it but it can develop at any age.    TREATMENT OPTIONS  Typically it can be treated with antifungal creams, lotions, ointments, sprays, or gels.  Many are available over the counter, some are prescription. It is important that you use them long enough, typically 4 weeks, to clear the rash. If an infection is particularly bad, your provider may prescribe oral medication. It is important that you follow the directions for any medication carefully to prevent recurrence of the rash.    HOME TREATMENT  1.  Keep feet clean and dry  2.  Dry between your toes any time your feet become wet  3.  Wear socks that are made of cotton, wool, or fibers designed to wick moisture away  from skin. Nylon, lycra, and spandex tend to trap moisture.  4.  If you have blistering, you may soak your feet in Burow's solution (aluminum acetate is active ingredient. Domeboro is a brand sold at [employer]). This is available over the counter.  5.  Treat shows with antifungal powder  6.  Tea Tree oil may help reduce symptoms but does not cure the rash.    PREVENTION  1.  Change socks or stockings regularly.  2.  Use talcum powder on your feet if they sweat a lot.  3.  Do not borrow shoes.  4.  Let shoes dry out for 24 hours before wearing them again.  5.  Treat shoes with fungal powder  6.  Wear shoes that are well ventilated such as leather shoes or sandals.  Avoid shoes  made of synthetic materials such as vinyl or rubber.  7.  Wear water proof sandals when you are in public areas such as locker rooms, pools, communal baths, showers, saunas, and fitness  centers.    FYI: Call or seek medical attention IMMEDIATELY if:  - You develop a fever over 100.4F for more than 24 hrs.  - There is a discharge of pus from infected areas.  - Red streaks develop from the affected areas  - Increase in redness, warmth, pain, swelling, or tenderness in the affected areas.    Body Mass Index (BMI)  Many things can cause foot and ankle problems. Foot structure, activity level, foot mechanics and injuries are common causes of pain.  One very important issue that often goes unmentioned, is body weight. Extra weight can cause increased stress on muscles, ligaments, bones and tendons.  Sometimes just a few extra pounds is all it takes to put one over her/his threshold. Without reducing that stress, it can be difficult to alleviate pain. Some people are uncomfortable addressing this issue, but we feel it is important for you to think about it. As Foot &  Ankle specialists, our job is addressing the lower extremity problem and possible causes. Regarding extra body weight, we encourage patients to discuss diet and weight management plans with their primary care doctors. It is this team approach that gives you the best opportunity for pain relief and getting you back on your feet.      Patient to follow up with Primary Care provider regarding elevated blood pressure.            Follow-ups after your visit        Follow-up notes from your care team     Return if symptoms worsen or fail to improve.      Who to contact     If you have questions or need follow up information about today's clinic visit or your schedule please contact [facility] directly at [phone].  Normal or non-critical lab and imaging results will be communicated to you by MyChart, letter or phone within 4 business days after the clinic has received the results. If you do not hear from us within 7 days, please contact the clinic through MyChart or phone. If you have a critical or abnormal lab result, we  "will notify you by phone as soon as possible.  Submit refill requests through Kivun Hadash or call your pharmacy and they will forward the refill request to us. Please allow 3 business days for your refill to be completed.          Additional Information About Your Visit        ReviewZAPhart Information     [employer]ash gives you secure access to your electronic health record. If you see a primary care provider, you can also send messages to your care team and make appointments. If you have questions, please call your primary care clinic.  If you do not have a primary care provider, please call [phone] and they will assist you.        Care EveryWhere ID     This is your Care EveryWhere ID. This could be used by other organizations to access your [facility] medical records  [id number]        Your Vitals Were     Height BMI (Body Mass Index)                5' 11\" (1.803 m) 34.17 kg/m2           Blood Pressure from Last 3 Encounters:   10/15/18 140/78   04/10/18 125/81   07/21/17 127/78    Weight from Last 3 Encounters:   10/15/18 245 lb (111.1 kg)   07/31/18 230 lb 6 oz (104.5 kg)   04/10/18 243 lb (110.2 kg)              Today, you had the following     No orders found for display       Primary Care Provider Office Phone # Fax #    Jj Monreal [phone] [address]  [facility] [address]        Equal Access to Services     JOZEF THRASHER AH: Hadii dee ku hadasho Soomaali, waaxda luqadaha, qaybta kaalmada adeegyada, geneva figueroa . So [facility] [phone].    ATENCIÓN: Si habla español, tiene a pineda disposición servicios gratuitos de asistencia lingüística. Blaze al [phone].    We comply with applicable federal civil rights laws and [place] laws. We do not discriminate on the basis of race, color, national origin, age, disability, sex, sexual orientation, or gender identity.            Thank you!     Thank you for choosing [facility]  for your care. Our " goal is always to provide you with excellent care. Hearing back from our patients is one way we can continue to improve our services. Please take a few minutes to complete the written survey that you may receive in the mail after your visit with us. Thank you!             Your Updated Medication List - Protect others around you: Learn how to safely use, store and throw away your medicines at www.disposemymeds.org.          This list is accurate as of 10/15/18 11:09 AM.  Always use your most recent med list.                   Brand Name Dispense Instructions for use Diagnosis    ASPIRIN EC PO      Take 81 mg by mouth Unknown dose        PARoxetine 20 MG tablet    PAXIL    90 tablet    Take 1 tablet (20 mg) by mouth daily    Anxiety

## 2018-10-15 NOTE — PROGRESS NOTES
"PATIENT HISTORY:  Pb Vincent is a 57 year old male who presents to clinic for a recurrent corn on L foot.  Also presents with rash to feet.  6 year duration.  3-5/10 pain.  Worse with pressure.  No treatments reported.  Denies fevers, chills, injury.  Nonsmoker.  Pt is working.  Nondiabetic.  Denies related family hx.     EXAM:Vitals: /78  Ht 5' 11\" (1.803 m)  Wt 245 lb (111.1 kg)  BMI 34.17 kg/m2  BMI= Body mass index is 34.17 kg/(m^2).    General appearance: Patient is alert and fully cooperative with history & exam.  No sign of distress is noted during the visit.     Dermatologic: b/l foot with scaling rash in moccasin distribution plantarly.  L lateral 5th MPJ with nucleated painful hyperkeratotic lesion. No paronychia or evidence of bacterial soft tissue infection is noted.  Mycotic thick toenails b/l.      Vascular: DP & PT pulses are intact & regular bilaterally.  No significant edema or varicosities noted.  CFT and skin temperature are normal to both lower extremities.     Neurologic: Lower extremity sensation is intact to light touch.  No evidence of weakness or contracture in the lower extremities.       Musculoskeletal: Mild L tailor's bunion.  Patient is ambulatory without assistive device or brace.  No gross ankle deformity noted.  No foot or ankle joint effusion is noted.     ASSESSMENT:   L foot callus  B/l tinea pedis     PLAN:  Reviewed patient's chart in epic.  Discussed condition and treatment options including pros and cons.    Discussed causes of calluses/keratomas.  They are due to areas of increased friction/pressure.  Discussed treatments such as using foot file, pumice stone, pads, orthotics, and not walking barefoot.  Discussed underlying bone/mild tailor's bunion as contributory.  Wider shoes advised.  Surgical correction is a last resort.    Advised Lamisil AT cream otc for the athlete's foot.     Pt requested debridement today for the callus.  I debrided the lesion with a " 15 blade.    List of routine foot care resources given.    F/u prn.      Lamont England DPM, FACFAS    Weight management plan: Patient was referred to their PCP to discuss a diet and exercise plan.     Patient to follow up with Primary Care provider regarding elevated blood pressure.

## 2018-10-15 NOTE — PATIENT INSTRUCTIONS
Try Lamisil AT cream, available over the counter.      Thank you for choosing [facility]!    Follow up as needed    DR. HAYES'S CLINIC LOCATIONS     MONDAY  [place] TUESDAY & FRIDAY AM  [place]   [facility]   [address]   [place] [address] [place] [address]   [phone]  [phone]  [phone]       WEDNESDAY  [place] SCHEDULE SURGERY: [phone]   [facility] APPOINTMENTS: [phone]   [place] [address] BILLING QUESTIONS: [phone]   [phone]         Calluses, Corns, IPKs, Porokeratosis    When there is excessive friction or pressure on the skin, the body responds by making the skin thicker to protect the deeper structures from becoming exposed.  While this works well to protect the deeper structures, the thickened skin can increase pressure and pain.    Flat, diffuse thickening are simple calluses and they are usually caused by friction.  Often these are the result of rubbing on a shoe or going barefoot.    Calluses with a central core between the toes are called corns.  These result from prominent joints on adjacent toes rubbing together.  Theses are a symptom of bone malalignment and will always recur unless the underlying bones are addressed surgically.    Calluses with a central core on the ball of the foot are usually IPKs (intractable plantar keratosis).  These are caused by excessive pressure from the metatarsals, the bones that make up the ball of the foot.  Often one of these bones is too long or too prominent.  Again, these will always recur unless the underlying bone issue is addressed.  There is no cure for these.  They will either go away by themselves, recur, or more could develop.    Regardless of the diagnosis, most of these lesions can be kept comfortable with routine maintenance.   1.This consists of filing them with a pumice stone or callus file a couple of  times per week.    2. Lotion can be applied to soften the callus. A urea based cream such as Kerasal or Vanicream or thicker cream with shea butter are good options.  3. Toe spacers or toe covers can be used for corns, gel pads can be used for other lesions on the bottom of the foot.   If there is a surgical pathology noted, such as a prominent bone, often this needs to be addressed surgically to minimize recurrence. However, sometimes the lesion simply migrates to another spot after surgery, so it is not a guaranteed cure.     Please call with any additional questions.   ROUTINE FOOT CARE NURSES  [facility]  [phone] [facility]  [phone]   [facility]  [phone]  [place] [phone]  [facility]   [facility]  [phone]  [facility] [phone]       Please call one of the above companies for insurance coverage, cost, and location information.        ATHLETE'S FOOT (TINEA PEDIS)  It is a rash that is caused by a fungus (most commonly Dermatophytes) in the outer layer of skin on the foot or in the nails. It can occur between the toes or on the instep and heel areas of the feet. Fungus will grow in warm and moist environments. The fungus that causes athlete's foot is contagious and you can get it from touching someone who has it of walking around bare foot around swimming pools, gyms, saunas, communal baths and showers, fitness centers or locker rooms.     SYMPTOMS  The symptoms of athlete's foot are numerous and include; itching, burning or stinging between the toes or on the soles of the feet, blisters, cracked and peeling skin, excessive dryness or excessive scaling on the bottom or sides of the feet, redness and softness with breaking down of the skin, especially between the toes, foot odor and thick, crumbly nails. Older males or people who live in warm humid environments are more prone to get it  but it can develop at any age.    TREATMENT OPTIONS  Typically it can be treated with antifungal creams, lotions, ointments, sprays, or gels.  Many are available over the counter, some are prescription. It is important that you use them long enough, typically 4 weeks, to clear the rash. If an infection is particularly bad, your provider may prescribe oral medication. It is important that you follow the directions for any medication carefully to prevent recurrence of the rash.    HOME TREATMENT  1.  Keep feet clean and dry  2.  Dry between your toes any time your feet become wet  3.  Wear socks that are made of cotton, wool, or fibers designed to wick moisture away  from skin. Nylon, lycra, and spandex tend to trap moisture.  4.  If you have blistering, you may soak your feet in Burow's solution (aluminum acetate is active ingredient. Domeboro is a brand sold at [employer]). This is available over the counter.  5.  Treat shows with antifungal powder  6.  Tea Tree oil may help reduce symptoms but does not cure the rash.    PREVENTION  1.  Change socks or stockings regularly.  2.  Use talcum powder on your feet if they sweat a lot.  3.  Do not borrow shoes.  4.  Let shoes dry out for 24 hours before wearing them again.  5.  Treat shoes with fungal powder  6.  Wear shoes that are well ventilated such as leather shoes or sandals.  Avoid shoes  made of synthetic materials such as vinyl or rubber.  7.  Wear water proof sandals when you are in public areas such as locker rooms, pools, communal baths, showers, saunas, and fitness centers.    FYI: Call or seek medical attention IMMEDIATELY if:  - You develop a fever over 100.4F for more than 24 hrs.  - There is a discharge of pus from infected areas.  - Red streaks develop from the affected areas  - Increase in redness, warmth, pain, swelling, or tenderness in the affected areas.    Body Mass Index (BMI)  Many things can cause foot and ankle problems. Foot structure, activity  level, foot mechanics and injuries are common causes of pain.  One very important issue that often goes unmentioned, is body weight. Extra weight can cause increased stress on muscles, ligaments, bones and tendons.  Sometimes just a few extra pounds is all it takes to put one over her/his threshold. Without reducing that stress, it can be difficult to alleviate pain. Some people are uncomfortable addressing this issue, but we feel it is important for you to think about it. As Foot &  Ankle specialists, our job is addressing the lower extremity problem and possible causes. Regarding extra body weight, we encourage patients to discuss diet and weight management plans with their primary care doctors. It is this team approach that gives you the best opportunity for pain relief and getting you back on your feet.      Patient to follow up with Primary Care provider regarding elevated blood pressure.

## 2019-08-09 ENCOUNTER — OFFICE VISIT (OUTPATIENT)
Dept: URGENT CARE | Facility: URGENT CARE | Age: 59
End: 2019-08-09
Payer: COMMERCIAL

## 2019-08-09 ENCOUNTER — HOSPITAL ENCOUNTER (EMERGENCY)
Facility: CLINIC | Age: 59
Discharge: HOME | End: 2019-08-09
Attending: EMERGENCY MEDICINE | Admitting: EMERGENCY MEDICINE
Payer: COMMERCIAL

## 2019-08-09 ENCOUNTER — TELEPHONE (OUTPATIENT)
Dept: FAMILY MEDICINE | Facility: CLINIC | Age: 59
End: 2019-08-09

## 2019-08-09 VITALS
RESPIRATION RATE: 12 BRPM | WEIGHT: 245 LBS | TEMPERATURE: 97.9 F | HEART RATE: 80 BPM | SYSTOLIC BLOOD PRESSURE: 128 MMHG | HEIGHT: 71 IN | DIASTOLIC BLOOD PRESSURE: 84 MMHG | BODY MASS INDEX: 34.3 KG/M2

## 2019-08-09 VITALS
DIASTOLIC BLOOD PRESSURE: 97 MMHG | BODY MASS INDEX: 34.17 KG/M2 | RESPIRATION RATE: 16 BRPM | WEIGHT: 245 LBS | TEMPERATURE: 98 F | HEART RATE: 81 BPM | OXYGEN SATURATION: 98 % | SYSTOLIC BLOOD PRESSURE: 146 MMHG

## 2019-08-09 DIAGNOSIS — Z53.9 DIAGNOSIS NOT YET DEFINED: Primary | ICD-10-CM

## 2019-08-09 DIAGNOSIS — H93.19 TINNITUS, UNSPECIFIED LATERALITY: ICD-10-CM

## 2019-08-09 PROCEDURE — 99282 EMERGENCY DEPT VISIT SF MDM: CPT | Mod: Z6 | Performed by: EMERGENCY MEDICINE

## 2019-08-09 PROCEDURE — 99282 EMERGENCY DEPT VISIT SF MDM: CPT | Performed by: EMERGENCY MEDICINE

## 2019-08-09 ASSESSMENT — MIFFLIN-ST. JEOR: SCORE: 1953.44

## 2019-08-09 NOTE — TELEPHONE ENCOUNTER
"Triaged call : patient was on way to [place] with son when he experienced what seemed to be a sensation in his ears of blood flow and it just felt strange \"hard to describe\"  It was concerning enough that he is now on his way back to [facility] to check in with an Urgent care provider about this.    Let him know we could do a general check of BP and vital signs and then if further assessment with scanning was deemed necessary by provider he would have to go to ED.    He understands.    He denies HA or other symptoms at this time.  No vision changes. He is alert and oriented. He had a vague feeling of numbness possibly in his arms but \"was not sure.   Anneliese Mahmood RN    "

## 2019-08-09 NOTE — ED AVS SNAPSHOT
[facility], [place], Emergency Department  [address] [place] [id number]  Phone:  [phone]                                    Pb Vincent   MRN: [id number]    Department:  [facility], Emergency Department   Date of Visit:  8/9/2019           After Visit Summary Signature Page    I have received my discharge instructions, and my questions have been answered. I have discussed any challenges I see with this plan with the nurse or doctor.    ..........................................................................................................................................  Patient/Patient Representative Signature      ..........................................................................................................................................  Patient Representative Print Name and Relationship to Patient    ..................................................               ................................................  Date                                   Time    ..........................................................................................................................................  Reviewed by Signature/Title    ...................................................              ..............................................  Date                                               Time          22EPIC Rev 08/18

## 2019-08-09 NOTE — TELEPHONE ENCOUNTER
Reason for Call:  Other call back    Detailed comments: Patient is requesting for a call back asap to discuss circulatory issues. Patient did not provide any further info and wanted to speak with an RN. Please advise, thank you!    Phone Number Patient can be reached at: Cell number on file:    Telephone Information:   Mobile [phone]       Best Time: anytime    Can we leave a detailed message on this number? YES    Call taken on 8/9/2019 at 2:49 PM by Jamila Bah

## 2019-08-09 NOTE — PROGRESS NOTES
"TRIAGE:  58 yr old male presents c/o intermittent sensation of \"whooshing\" or roaring/pulsatile sensation in both ears when he woke up this morning.   Symptoms intermittent all morning.  Then had onset of the pulsatile/whoosing sensation in the top of the head that becomes worse with lying down or turning his head to the right. No severe headache, no vision changes, no speech difficulty.  While driving up to [place] this afternoon, he noted numbness in his hands and feet that is resolved now.  No pulsatile sensation or headaches at this time.     /84   Pulse 80   Temp 97.9  F (36.6  C) (Oral)   Resp 12   Ht 1.803 m (5' 11\")   Wt 111.1 kg (245 lb)   BMI 34.17 kg/m    Vitals as above.  To ER for evaluation tonight.   I spoke with ER at [place], where they plan to go, and they are expected patient and his spouse shortly by private vehicle.  "

## 2019-08-10 NOTE — ED TRIAGE NOTES
"Pt arrived to the ED with c/o \"pounding in my ears.\" Pt also reports he did not sleep well. Pt reports it has going on throughout the day. Pt also reports feeling numbness in both hands. Pt also stated he feels \"wooshing in my head.\" Pt reports he went to urgent care and it was determined he does not have an ear infection. Pt reports he took two baby asprin this morning. Denies any balance issues, denies a headache, denies any tingling or numbness in his arms or legs at this time.   "

## 2019-08-10 NOTE — DISCHARGE INSTRUCTIONS
TODAY'S VISIT:  You were seen today for ear whooshing noise.   - Being you did not want further evaluation here tonight, We recommend you follow-up as soon as possible with your Primary Care provider (Or ENT or Neurology).     FOLLOW-UP:  Please make an appointment to follow up with:  - Your Primary Care Provider, as soon as possible.  - For your reference: [facility] (phone: [phone]) and Neurology Clinic (phone: [phone]) to be seen in clinic if you need.   - Have your provider review the results from today's visit with you again to make sure no further follow-up or additional testing is needed based on those results.     OTHER INSTRUCTIONS:  - Do your best to stay hydrated.    RETURN TO THE EMERGENCY DEPARTMENT  Return to the Emergency Department at any time for any new or worsening symptoms or any concerns.

## 2019-08-10 NOTE — ED PROVIDER NOTES
[place] EMERGENCY DEPARTMENT ([facility])  8/09/19    History     Chief Complaint   Patient presents with     Tinnitus     The history is provided by the patient and medical records.     Pb Vincent is a 58 year old male with PMH notable for anxiety, obesity and prediabetes, presents today with a 1 day history of intermittent pounding/whooshing sensation in the head.    Patient says that intermittently throughout the day he has had some pounding in his ears that he says almost like hearing his heartbeat with a slight whooshing sensation.  Felt somewhat off throughout the day and some numbness in his hands. The numbness sensation is improved and is not currently having a pounding or whooshing sensation.  Denies any trouble with balance, no vertiginous spinning.  No hearing changes/hearing loss or ear drainage.  No particular ear pain.  No vision changes, loss of vision, etc.  Also feeling the pounding/whooshing particularly in the left side of his head no particular headache.  No neck pain or stiffness.  No chest pain, palpitations or shortness of breath.  No nausea or vomiting.  No numbness (outside of what he had in his hands earlier) and then no weakness, altered sensation, etc. in the extremities.  No traumas or falls.  No thunderclap onset to symptoms.  Not particularly worse with head movements.  Nothing else seems to make better worse, just would happen intermittently since then.  He denies ever having anything like this before.  He took 2 baby aspirin today earlier but does not take aspirin usually and not in significant amounts.  He does report heavy caffeine use, says he did not sleep well last night and wonder if that is playing a role.  No other recent medication changes.  He also reports listening to some louder music.  No previous history of vascular disease, strokes, etc. for this patient.  He does have some family history of cancer and cerebrovascular disease.  He is a non-smoker.   No other new symptoms or complaints at this time.  Please see ROS for further details.    This part of the medical record was transcribed by Macario Vidal Medical Scribe, from a dictation done by Sheila Ortiz MD.     I have reviewed the Medications, Allergies, Past Medical and Surgical History, and Social History in the [facility] system.    Past Medical History:   Diagnosis Date     at risk drinking      Depressive disorder, not elsewhere classified      Mixed hyperlipidemia        Past Surgical History:   Procedure Laterality Date     ankle  Right 2005     C APPENDECTOMY       HC REPAIR INCISIONAL HERNIA,REDUCIBLE      Hernia Repair, Incisional, Unilateral     SURGICAL HISTORY OF -       ORIF ankle surgery       Family History   Problem Relation Age of Onset     Cancer Father         lung      Cerebrovascular Disease Sister 68     Prostate Cancer Paternal Uncle         very advanced age     C.A.D. No family hx of      Diabetes No family hx of      Hypertension No family hx of      Cancer - colorectal No family hx of        Social History     Tobacco Use     Smoking status: Never Smoker     Smokeless tobacco: Never Used   Substance Use Topics     Alcohol use: No     Comment: beer        No current facility-administered medications for this encounter.      Current Outpatient Medications   Medication     ASPIRIN EC PO     PARoxetine (PAXIL) 20 MG tablet        Allergies   Allergen Reactions     Chlorhexidine Gluconate      Blisters skin, use alchol for IVs     Feathers      Penicillins      Wool Fiber          Review of Systems   HENT: Positive for tinnitus (Pounding/whooshing). Negative for ear discharge, ear pain and hearing loss.    Eyes: Negative for visual disturbance.   Respiratory: Negative for shortness of breath.    Cardiovascular: Negative for chest pain and palpitations.   Gastrointestinal: Negative for nausea and vomiting.   Musculoskeletal: Negative for gait problem, neck pain and neck stiffness.    Neurological: Positive for numbness (Hands, resolved). Negative for dizziness, weakness, light-headedness and headaches.       Physical Exam   BP: (!) 158/89  Pulse: 87  Heart Rate: 90  Temp: 98  F (36.7  C)  Resp: 16  Weight: 111.1 kg (245 lb)  SpO2: 97 %      Physical Exam   CONSTITUTIONAL: Well-developed and well-nourished. Awake and alert. Non-toxic appearance. No acute distress.   HENT:   - Head: Normocephalic and atraumatic.  No pain over the mastoid area  - Ears: Hearing and external ear grossly normal. TMs are intact, no erythema or perforation.  No apparent drainage.  - Nose: Nose normal. No rhinorrhea. No epistaxis.   - Mouth/Throat: MMM.  No tongue deviation.  EYES: Conjunctivae and lids are normal. PERRL. No scleral icterus.  No nystagmus or abnormal eye movements.  NECK: Normal range of motion and phonation normal. Neck supple.  No tracheal deviation, no stridor. No edema or erythema noted. No audible bruits. No trouble with neck ROM  CARDIOVASCULAR: Normal rate, regular rhythm and no appreciable abnormal heart sounds.  PULMONARY/CHEST: Normal work of breathing. No accessory muscle usage or stridor. No respiratory distress.  No appreciable abnormal breath sounds.  ABDOMEN: Soft, non-distended. No tenderness. No rigidity, rebound or guarding.   MUSCULOSKELETAL: Extremities warm and seemingly well perfused. No edema or calf tenderness.  NEUROLOGIC: Awake, alert. Not disoriented. He displays no atrophy and no tremor. Normal tone. No seizure activity. GCS 15.  Cranial nerves intact.  Strength and sensation intact.  No apparent trouble with coordination.  No appreciable focal neuro deficits.   SKIN: Skin is warm and dry. No rash noted. No diaphoresis. No pallor.   PSYCHIATRIC: Normal mood and affect. Speech and behavior normal. Thought processes linear. Cognition and memory are normal.       Assessments & Plan (with Medical Decision Making)   IMPRESSION: 58 year old male w/ PMH notable for for anxiety,  obesity and prediabetes, presents today with a 1 day history of intermittent pounding/whooshing sensation in the head.  Initially he also had some altered sensation in his hands which had since resolved and are currently normal on examination.    Patient was initially brought back to the ED triage area.  Goal was to perform initial evaluation and initiate laboratory testing and head/neck imaging for his full ED work-up.      However during that initial ED triage time the patient expressed desire for discharge.  Initially, he would not even answer all of the history questions before stopping the provider to see what a work-up would entail prior to allowing permission for any testing.  We did have a thorough discussion regarding potential differential diagnoses for the symptoms, some which are common and benign and some of which may be less common, but could certainly be dangerous, even to the point of leading to death or permanent disability (strokes, vascular disease, occult infections, etc.), and with the work-up for such would entail. Despite this thorough discussion of the risks/benefits and alternatives for work-up of his symptoms given the potentially dangerous causes versus leaving the ED at this time, patient expresses desire for discharge and refuses any work-up beyond the physical exam.      He is able to express understanding of the potential risks including but not limited to the risk of death or permanent disability and reports that he is willing to accept these risks.  This decision was supported by his loved one who accompanied him to the ED today.  He was awake and alert at the time of this discussion, was able to express understanding and asked appropriate questions.  He was not clinically intoxicated at the time.  Appears to have decision-making capacity at this time.    As the patient will be discharged as per his request without any formal work-up for this, despite said risks, we would like to  support him in the next potential steps.  We discussed him arranging a follow-up appointment in the outpatient setting as soon as possible where they can talk about obtaining imaging and further testing at that time.  (MRI, CT, etc.).  He will call first thing once clinic opens to arrange. We also reiterated multiple times that the patient is always welcome to return to the Emergency Department, even if he has no new symptoms but simply changes his mind and he wants to allow our work-up and further evaluation, but especially if he has any new or worsening symptoms or any concerns he should return immediately.  He expresses understanding of this as well and appreciation for time of care.  We wish him the best.      PLAN: Discharge to home at pt request, despite discussion of potential risks    DISCUSSIONS:  - w/ Patient:  Patient expresses desire to leave and not have workup for the symptoms for which he presented. I have reviewed the symptoms, some of the pertinent possible diagnoses, and the potential risks of leaving this time, including but not limited to death or permanent disability. Pt expresses understanding of these risks and still desires to leave w/o formal evaluation or workup.  As discussed, pt maintained decision making capacity during ED evaluation.     DISPOSITION/PLANNING:  - IMPRESSION: Ear pounding/whooshing sensation, unable to work-up and EDS patient request  - DISPOSITION: Discharge to home at patient request  - FOLLOW-UP: w/ PCP as soon as possible (tomorrow, Monday, Tuesday at absolute latest)  - RECOMMENDATIONS: Conservative symptom management, strict return instructions      ______________________________________________________________________    This part of the medical record was transcribed by Macario Vidal, Medical Scribe, from a dictation done by Sheila Ortiz MD.       I, Macario Vidal, am serving as a trained medical scribe to document services personally performed by Sheila Ortiz MD,  based on the provider's statements to me.   I, Sheila Ortiz MD, was physically present and have reviewed and verified the accuracy of this note documented by Macario Vidal.    8/9/2019   [facility], [place], EMERGENCY DEPARTMENT     Sheila Ortiz MD  08/14/19 [id number]

## 2019-08-11 ASSESSMENT — ENCOUNTER SYMPTOMS
LIGHT-HEADEDNESS: 0
SHORTNESS OF BREATH: 0
HEADACHES: 0
WEAKNESS: 0
NAUSEA: 0
NECK PAIN: 0
PALPITATIONS: 0
VOMITING: 0
NECK STIFFNESS: 0
NUMBNESS: 1
DIZZINESS: 0

## 2019-08-30 ENCOUNTER — OFFICE VISIT (OUTPATIENT)
Dept: FAMILY MEDICINE | Facility: CLINIC | Age: 59
End: 2019-08-30
Payer: COMMERCIAL

## 2019-08-30 VITALS
HEIGHT: 71 IN | WEIGHT: 240 LBS | HEART RATE: 88 BPM | DIASTOLIC BLOOD PRESSURE: 78 MMHG | BODY MASS INDEX: 33.6 KG/M2 | OXYGEN SATURATION: 97 % | SYSTOLIC BLOOD PRESSURE: 118 MMHG | TEMPERATURE: 98.6 F | RESPIRATION RATE: 20 BRPM

## 2019-08-30 DIAGNOSIS — Z12.11 COLON CANCER SCREENING: ICD-10-CM

## 2019-08-30 DIAGNOSIS — Z12.5 SCREENING PSA (PROSTATE SPECIFIC ANTIGEN): ICD-10-CM

## 2019-08-30 DIAGNOSIS — F41.9 ANXIETY: ICD-10-CM

## 2019-08-30 DIAGNOSIS — Z13.1 DIABETES MELLITUS SCREENING: ICD-10-CM

## 2019-08-30 DIAGNOSIS — Z00.00 ROUTINE GENERAL MEDICAL EXAMINATION AT A HEALTH CARE FACILITY: Primary | ICD-10-CM

## 2019-08-30 PROCEDURE — 90471 IMMUNIZATION ADMIN: CPT | Performed by: FAMILY MEDICINE

## 2019-08-30 PROCEDURE — 90750 HZV VACC RECOMBINANT IM: CPT | Performed by: FAMILY MEDICINE

## 2019-08-30 PROCEDURE — 99396 PREV VISIT EST AGE 40-64: CPT | Mod: 25 | Performed by: FAMILY MEDICINE

## 2019-08-30 RX ORDER — PAROXETINE 30 MG/1
15 TABLET, FILM COATED ORAL EVERY MORNING
Qty: 45 TABLET | Refills: 3 | Status: SHIPPED | OUTPATIENT
Start: 2019-08-30 | End: 2020-02-11

## 2019-08-30 ASSESSMENT — MIFFLIN-ST. JEOR: SCORE: 1930.76

## 2019-08-30 NOTE — PROGRESS NOTES
SUBJECTIVE:   CC: Pb Vincent is an 58 year old male who presents for preventative health visit.     Healthy Habits:     Getting at least 3 servings of Calcium per day:  Yes    Bi-annual eye exam:  Yes    Dental care twice a year:  Yes    Sleep apnea or symptoms of sleep apnea:  Excessive snoring    Diet:  Regular (no restrictions)    Frequency of exercise:  2-3 days/week    Duration of exercise:  15-30 minutes    Taking medications regularly:  Yes    Medication side effects:  None    PHQ-2 Total Score: 0    Additional concerns today:  No      Today's PHQ-2 Score:   PHQ-2 ( 1999 [employer]) 8/30/2019   Q1: Little interest or pleasure in doing things 0   Q2: Feeling down, depressed or hopeless 0   PHQ-2 Score 0   Q1: Little interest or pleasure in doing things Not at all   Q2: Feeling down, depressed or hopeless Not at all   PHQ-2 Score 0       Abuse: Current or Past(Physical, Sexual or Emotional)- No  Do you feel safe in your environment? Yes    Social History     Tobacco Use     Smoking status: Never Smoker     Smokeless tobacco: Never Used   Substance Use Topics     Alcohol use: Not Currently     If you drink alcohol do you typically have >3 drinks per day or >7 drinks per week? No    Alcohol Use 8/30/2019   Prescreen: >3 drinks/day or >7 drinks/week? Not Applicable   Prescreen: >3 drinks/day or >7 drinks/week? -       Last PSA:   PSA   Date Value Ref Range Status   07/21/2017 1.54 0 - 4 ug/L Final     Comment:     Assay Method:  Chemiluminescence using Siemens Vista analyzer       Reviewed orders with patient. Reviewed health maintenance and updated orders accordingly - Yes  Lab work is in process    Reviewed and updated as needed this visit by clinical staff  Tobacco  Allergies  Meds  Med Hx  Surg Hx  Fam Hx  Soc Hx        Reviewed and updated as needed this visit by Provider  Meds            Review of Systems  CONSTITUTIONAL: NEGATIVE for fever, chills, change in weight  INTEGUMENTARY/SKIN: NEGATIVE for  "worrisome rashes, moles or lesions  EYES: NEGATIVE for vision changes or irritation  ENT: NEGATIVE for ear, mouth and throat problems  RESP: NEGATIVE for significant cough or SOB  CV: NEGATIVE for chest pain, palpitations or peripheral edema  GI: NEGATIVE for nausea, abdominal pain, heartburn, or change in bowel habits   male: negative for dysuria, hematuria, decreased urinary stream, erectile dysfunction, urethral discharge  MUSCULOSKELETAL: NEGATIVE for significant arthralgias or myalgia  NEURO: NEGATIVE for weakness, dizziness or paresthesias  PSYCHIATRIC: NEGATIVE for changes in mood or affect    OBJECTIVE:   /78 (BP Location: Left arm, Patient Position: Sitting, Cuff Size: Adult Regular)   Pulse 88   Temp 98.6  F (37  C) (Oral)   Resp 20   Ht 1.803 m (5' 11\")   Wt 108.9 kg (240 lb)   SpO2 97%   BMI 33.47 kg/m      Physical Exam  GENERAL: healthy, alert and no distress  EYES: Eyes grossly normal to inspection, PERRL and conjunctivae and sclerae normal  HENT: ear canals and TM's normal, nose and mouth without ulcers or lesions  NECK: no adenopathy, no asymmetry, masses, or scars and thyroid normal to palpation  RESP: lungs clear to auscultation - no rales, rhonchi or wheezes  CV: regular rate and rhythm, normal S1 S2, no S3 or S4, no murmur, click or rub, no peripheral edema and peripheral pulses strong  ABDOMEN: soft, nontender, no hepatosplenomegaly, no masses and bowel sounds normal  MS: no gross musculoskeletal defects noted, no edema  SKIN: no suspicious lesions or rashes  NEURO: Normal strength and tone, mentation intact and speech normal  PSYCH: mentation appears normal, affect normal/bright    Diagnostic Test Results:  Labs reviewed in Epic    ASSESSMENT/PLAN:   1. Screening PSA (prostate specific antigen)     - Prostate spec antigen screen; Future    2. Colon cancer screening   UTD    3. Diabetes mellitus screening     - Glucose; Future    4. Routine general medical examination at a [facility] " "care facility  Discussed HCM. Immunizations discussed and updated where needed.   - HC ZOSTER VACCINE RECOMBINANT ADJUVANTED IM NJX  - Lipid panel reflex to direct LDL Fasting; Future    5. Anxiety  Discussed optoins. Will try lowring to 15mg daily with intent to slowly taper off.   - PARoxetine (PAXIL) 30 MG tablet; Take 0.5 tablets (15 mg) by mouth every morning  Dispense: 45 tablet; Refill: 3    COUNSELING:   Reviewed preventive health counseling, as reflected in patient instructions       Regular exercise       Healthy diet/nutrition       Immunizations    Vaccinated for: Zoster             HIV screeninx in teen years, 1x in adult years, and at intervals if high risk       Colon cancer screening       Prostate cancer screening    Estimated body mass index is 33.47 kg/m  as calculated from the following:    Height as of this encounter: 1.803 m (5' 11\").    Weight as of this encounter: 108.9 kg (240 lb).     Weight management plan: Discussed healthy diet and exercise guidelines     reports that he has never smoked. He has never used smokeless tobacco.      Counseling Resources:  ATP IV Guidelines  Pooled Cohorts Equation Calculator  FRAX Risk Assessment  ICSI Preventive Guidelines  Dietary Guidelines for Americans,   USDA's MyPlate  ASA Prophylaxis  Lung CA Screening    Jj Monreal MD  [facility]  "

## 2019-08-30 NOTE — NURSING NOTE
Prior to immunization administration, verified patients identity using patient s name and date of birth. Please see Immunization Activity for additional information.     Screening Questionnaire for Adult Immunization    Are you sick today?   No   Do you have allergies to medications, food, a vaccine component or latex?   No   Have you ever had a serious reaction after receiving a vaccination?   No   Do you have a long-term health problem with heart disease, lung disease, asthma, kidney disease, metabolic disease (e.g. diabetes), anemia, or other blood disorder?   No   Do you have cancer, leukemia, HIV/AIDS, or any other immune system problem?   No   In the past 3 months, have you taken medications that affect  your immune system, such as prednisone, other steroids, or anticancer drugs; drugs for the treatment of rheumatoid arthritis, Crohn s disease, or psoriasis; or have you had radiation treatments?   No   Have you had a seizure, or a brain or other nervous system problem?   No   During the past year, have you received a transfusion of blood or blood     products, or been given immune (gamma) globulin or antiviral drug?   No   For women: Are you pregnant or is there a chance you could become        pregnant during the next month?   No   Have you received any vaccinations in the past 4 weeks?   No     Immunization questionnaire answers were all negative.        Per orders of Dr. Monreal, injection of Shingrix given by Lalitha England CMA. Patient instructed to remain in clinic for 15 minutes afterwards, and to report any adverse reaction to me immediately.       Screening performed by Lalitha England CMA on 8/30/2019 at 3:17 PM.

## 2019-09-05 DIAGNOSIS — Z13.1 DIABETES MELLITUS SCREENING: ICD-10-CM

## 2019-09-05 DIAGNOSIS — E78.2 MIXED HYPERLIPIDEMIA: ICD-10-CM

## 2019-09-05 DIAGNOSIS — Z12.5 SCREENING PSA (PROSTATE SPECIFIC ANTIGEN): ICD-10-CM

## 2019-09-05 DIAGNOSIS — R73.9 ELEVATED BLOOD SUGAR: Primary | ICD-10-CM

## 2019-09-05 DIAGNOSIS — Z00.00 ROUTINE GENERAL MEDICAL EXAMINATION AT A HEALTH CARE FACILITY: ICD-10-CM

## 2019-09-05 LAB
CHOLEST SERPL-MCNC: 201 MG/DL
GLUCOSE SERPL-MCNC: 115 MG/DL (ref 70–99)
HDLC SERPL-MCNC: 27 MG/DL
LDLC SERPL CALC-MCNC: ABNORMAL MG/DL
LDLC SERPL DIRECT ASSAY-MCNC: 93 MG/DL
NONHDLC SERPL-MCNC: 174 MG/DL
PSA SERPL-ACNC: 1.64 UG/L (ref 0–4)
TRIGL SERPL-MCNC: 454 MG/DL

## 2019-09-05 PROCEDURE — 82947 ASSAY GLUCOSE BLOOD QUANT: CPT | Performed by: FAMILY MEDICINE

## 2019-09-05 PROCEDURE — 36415 COLL VENOUS BLD VENIPUNCTURE: CPT | Performed by: FAMILY MEDICINE

## 2019-09-05 PROCEDURE — G0103 PSA SCREENING: HCPCS | Performed by: FAMILY MEDICINE

## 2019-09-05 PROCEDURE — 80061 LIPID PANEL: CPT | Performed by: FAMILY MEDICINE

## 2019-09-05 PROCEDURE — 83721 ASSAY OF BLOOD LIPOPROTEIN: CPT | Performed by: FAMILY MEDICINE

## 2019-10-10 ENCOUNTER — OFFICE VISIT (OUTPATIENT)
Dept: URGENT CARE | Facility: URGENT CARE | Age: 59
End: 2019-10-10
Payer: COMMERCIAL

## 2019-10-10 VITALS
OXYGEN SATURATION: 98 % | DIASTOLIC BLOOD PRESSURE: 87 MMHG | BODY MASS INDEX: 33.47 KG/M2 | SYSTOLIC BLOOD PRESSURE: 127 MMHG | HEART RATE: 90 BPM | WEIGHT: 240 LBS | TEMPERATURE: 98.5 F

## 2019-10-10 DIAGNOSIS — R73.9 ELEVATED BLOOD SUGAR: ICD-10-CM

## 2019-10-10 DIAGNOSIS — E78.2 MIXED HYPERLIPIDEMIA: ICD-10-CM

## 2019-10-10 DIAGNOSIS — G62.9 NEUROPATHY: Primary | ICD-10-CM

## 2019-10-10 LAB — HBA1C MFR BLD: 5.8 % (ref 0–5.6)

## 2019-10-10 PROCEDURE — 82728 ASSAY OF FERRITIN: CPT | Performed by: FAMILY MEDICINE

## 2019-10-10 PROCEDURE — 82607 VITAMIN B-12: CPT | Performed by: FAMILY MEDICINE

## 2019-10-10 PROCEDURE — 36415 COLL VENOUS BLD VENIPUNCTURE: CPT | Performed by: FAMILY MEDICINE

## 2019-10-10 PROCEDURE — 83036 HEMOGLOBIN GLYCOSYLATED A1C: CPT | Performed by: FAMILY MEDICINE

## 2019-10-10 PROCEDURE — 80061 LIPID PANEL: CPT | Performed by: FAMILY MEDICINE

## 2019-10-10 PROCEDURE — 82746 ASSAY OF FOLIC ACID SERUM: CPT | Performed by: FAMILY MEDICINE

## 2019-10-10 PROCEDURE — 99214 OFFICE O/P EST MOD 30 MIN: CPT | Performed by: FAMILY MEDICINE

## 2019-10-10 NOTE — PROGRESS NOTES
Subjective:   Pb Vincent is a 58 year old male who presents for   Chief Complaint   Patient presents with     Urgent Care     Hypertension     c/o high blood pressure and numbness for 1 day      1 other episode of numbness and tingling of hands/feet prior in the past about 6 weeks the episode in total lasted approximately less than half a day.     He denies facial droop or slurred speech, no inability to use extremities. No visual difficulties. Denies headaches.   His current symptoms have been present for 2 hours.     Patient is wondering if high blood pressure may be correlated with this. He is not treated for HTN. The symptoms are quite light.        Patient Active Problem List    Diagnosis Date Noted     Prediabetes 04/25/2015     Priority: Medium     Obesity 06/22/2012     Priority: Medium     Anxiety 08/07/2009     Priority: Medium       Current Outpatient Medications   Medication     ASPIRIN EC PO     PARoxetine (PAXIL) 20 MG tablet     PARoxetine (PAXIL) 30 MG tablet     No current facility-administered medications for this visit.      ROS:  As above per HPI    Objective:   /87   Pulse 90   Temp 98.5  F (36.9  C) (Oral)   Wt 108.9 kg (240 lb)   SpO2 98%   BMI 33.47 kg/m  , Body mass index is 33.47 kg/m .  Gen:  NAD, well-nourished, sitting in chair comfortably  HEENT: EOMI, sclera anicteric, Head normocephalic, ; nares patent; moist mucous membranes, amblyopia right eye  Neck: trachea midline, no thyromegaly  CV:  Hemodynamically stable, RRR   Pulm:  no increased work of breathing  Extrem: no cyanosis, edema or clubbing  Skin: no obvious rashes or abnormalities  Psych: Euthymic, linear thoughts, normal rate of speech  Neuro: intact sensation to touch diffusely, no facial droop, no slurred speech  Gait: normal    Assessment & Plan:   Pb Vincent, 58 year old male who presents with:  Neuropathy  Symptoms are mild in presentation, we will screen for vitamin and iron depletion at this time.  Without history of slipped disc/nerve impingement, diabetes, or family history of neurological disorders. F/u if worsening symptoms. Stable blood pressure  - Ferritin  - Vitamin B12  - Folate      Fidencio Rapp MD   [place] [facility]    The use of Dragon/[employer] dictation services may have been used to construct the content in this note; any grammatical or spelling errors are non-intentional. Please contact the author of this note directly if you are in need of any clarification.

## 2019-10-11 LAB
CHOLEST SERPL-MCNC: 197 MG/DL
FERRITIN SERPL-MCNC: 162 NG/ML (ref 26–388)
FOLATE SERPL-MCNC: 20.1 NG/ML
HDLC SERPL-MCNC: 28 MG/DL
LDLC SERPL CALC-MCNC: 104 MG/DL
NONHDLC SERPL-MCNC: 169 MG/DL
TRIGL SERPL-MCNC: 327 MG/DL
VIT B12 SERPL-MCNC: 433 PG/ML (ref 193–986)

## 2019-10-11 NOTE — PATIENT INSTRUCTIONS
We will have the results of your tests back by early next week    If you have worsening symptoms please call the clinic to discuss

## 2019-11-04 ENCOUNTER — HEALTH MAINTENANCE LETTER (OUTPATIENT)
Age: 59
End: 2019-11-04

## 2020-01-19 ENCOUNTER — ANCILLARY PROCEDURE (OUTPATIENT)
Dept: GENERAL RADIOLOGY | Facility: CLINIC | Age: 60
End: 2020-01-19
Attending: PHYSICIAN ASSISTANT
Payer: COMMERCIAL

## 2020-01-19 ENCOUNTER — OFFICE VISIT (OUTPATIENT)
Dept: URGENT CARE | Facility: URGENT CARE | Age: 60
End: 2020-01-19
Payer: COMMERCIAL

## 2020-01-19 VITALS
BODY MASS INDEX: 33.6 KG/M2 | DIASTOLIC BLOOD PRESSURE: 86 MMHG | HEIGHT: 71 IN | TEMPERATURE: 98.3 F | SYSTOLIC BLOOD PRESSURE: 138 MMHG | HEART RATE: 107 BPM | WEIGHT: 240 LBS | OXYGEN SATURATION: 98 % | RESPIRATION RATE: 16 BRPM

## 2020-01-19 DIAGNOSIS — R05.9 COUGH: Primary | ICD-10-CM

## 2020-01-19 DIAGNOSIS — J22 LOWER RESP. TRACT INFECTION: ICD-10-CM

## 2020-01-19 DIAGNOSIS — R05.9 COUGH: ICD-10-CM

## 2020-01-19 LAB
ERYTHROCYTE [DISTWIDTH] IN BLOOD BY AUTOMATED COUNT: 13.2 % (ref 10–15)
HCT VFR BLD AUTO: 45.5 % (ref 40–53)
HGB BLD-MCNC: 15.3 G/DL (ref 13.3–17.7)
MCH RBC QN AUTO: 30.2 PG (ref 26.5–33)
MCHC RBC AUTO-ENTMCNC: 33.6 G/DL (ref 31.5–36.5)
MCV RBC AUTO: 90 FL (ref 78–100)
PLATELET # BLD AUTO: 265 10E9/L (ref 150–450)
RBC # BLD AUTO: 5.06 10E12/L (ref 4.4–5.9)
WBC # BLD AUTO: 9.9 10E9/L (ref 4–11)

## 2020-01-19 PROCEDURE — 36415 COLL VENOUS BLD VENIPUNCTURE: CPT | Performed by: PHYSICIAN ASSISTANT

## 2020-01-19 PROCEDURE — 99214 OFFICE O/P EST MOD 30 MIN: CPT | Performed by: PHYSICIAN ASSISTANT

## 2020-01-19 PROCEDURE — 85027 COMPLETE CBC AUTOMATED: CPT | Performed by: PHYSICIAN ASSISTANT

## 2020-01-19 PROCEDURE — 71046 X-RAY EXAM CHEST 2 VIEWS: CPT

## 2020-01-19 RX ORDER — DOXYCYCLINE HYCLATE 100 MG
100 TABLET ORAL 2 TIMES DAILY
Qty: 20 TABLET | Refills: 0 | Status: SHIPPED | OUTPATIENT
Start: 2020-01-19 | End: 2020-02-11

## 2020-01-19 ASSESSMENT — MIFFLIN-ST. JEOR: SCORE: 1925.76

## 2020-01-19 NOTE — PROGRESS NOTES
"SUBJECTIVE:   Pb Vincent is a 59 year old male presenting with a chief complaint of cough. Patient was sick 5 weeks ago with \"the cold from hell\"   Patient reports that his cold symptoms improved, but he still does not feel 100%.   His cough is reported to be intermittent and mostly dry.   He has used OTC medications.   His boss has been sick with the same symptoms.   Denies fever/chills, HA, CP, pleuritic chest pain, hemoptysis, SOB, abd pain, N/V/D, rash, or any other symptoms.    Past Medical History:   Diagnosis Date     at risk drinking      Depressive disorder, not elsewhere classified      Mixed hyperlipidemia      Current Outpatient Medications   Medication Sig Dispense Refill     PARoxetine (PAXIL) 30 MG tablet Take 0.5 tablets (15 mg) by mouth every morning 45 tablet 3     ASPIRIN EC PO Take 81 mg by mouth Unknown dose        PARoxetine (PAXIL) 20 MG tablet Take 1 tablet (20 mg) by mouth daily 90 tablet 3     Social History     Tobacco Use     Smoking status: Never Smoker     Smokeless tobacco: Never Used   Substance Use Topics     Alcohol use: Not Currently       ROS:  Review of systems negative except as stated above.    OBJECTIVE:  /86   Pulse 107   Temp 98.3  F (36.8  C) (Oral)   Resp 16   Ht 1.803 m (5' 11\")   Wt 108.9 kg (240 lb)   SpO2 98%   BMI 33.47 kg/m    GENERAL APPEARANCE: healthy, alert and no distress  EYES: EOMI,  PERRL, conjunctiva clear  HENT: ear canals and TM's normal.  Nose and mouth without ulcers, erythema or lesions  NECK: supple, nontender, no lymphadenopathy  RESP: lungs clear to auscultation - no rales, rhonchi or wheezes  CV: regular rates and rhythm, normal S1 S2, no murmur noted  ABDOMEN:  soft, nontender  NEURO: Normal strength and tone, sensory exam grossly normal,  normal speech and mentation  SKIN: no suspicious lesions or rashes    Results for orders placed or performed in visit on 01/19/20   CBC with platelets     Status: None   Result Value Ref Range "    WBC 9.9 4.0 - 11.0 10e9/L    RBC Count 5.06 4.4 - 5.9 10e12/L    Hemoglobin 15.3 13.3 - 17.7 g/dL    Hematocrit 45.5 40.0 - 53.0 %    MCV 90 78 - 100 fl    MCH 30.2 26.5 - 33.0 pg    MCHC 33.6 31.5 - 36.5 g/dL    RDW 13.2 10.0 - 15.0 %    Platelet Count 265 150 - 450 10e9/L       CXR -- Left infiltrate, pending radiology report    ASSESSMENT / PLAN:  1. Cough    - XR Chest 2 Views; Future  - CBC with platelets    2. Lower resp. tract infection  Appears to have small left infiltrate, pending radiology report.   No evidence of sepsis  Encouraged fluids and rest, can use Mucinex DM for cough  - doxycycline hyclate (VIBRA-TABS) 100 MG tablet; Take 1 tablet (100 mg) by mouth 2 times daily  Dispense: 20 tablet; Refill: 0    Diagnosis and treatment plan was reviewed with patient and/or family.   We went over any labs or imaging. Discussed worsening symptoms or little to no relief despite treatment plan to follow-up with PCP or return to clinic.  Patient verbalizes understanding. All questions were addressed and answered.   Giselle Peraza PA-C

## 2020-01-24 ENCOUNTER — OFFICE VISIT (OUTPATIENT)
Dept: FAMILY MEDICINE | Facility: CLINIC | Age: 60
End: 2020-01-24
Payer: COMMERCIAL

## 2020-01-24 ENCOUNTER — TELEPHONE (OUTPATIENT)
Dept: FAMILY MEDICINE | Facility: CLINIC | Age: 60
End: 2020-01-24

## 2020-01-24 VITALS
OXYGEN SATURATION: 95 % | HEIGHT: 71 IN | BODY MASS INDEX: 35.22 KG/M2 | WEIGHT: 251.6 LBS | DIASTOLIC BLOOD PRESSURE: 82 MMHG | SYSTOLIC BLOOD PRESSURE: 128 MMHG | TEMPERATURE: 98.6 F | HEART RATE: 109 BPM

## 2020-01-24 DIAGNOSIS — J01.90 ACUTE SINUSITIS WITH SYMPTOMS > 10 DAYS: Primary | ICD-10-CM

## 2020-01-24 PROCEDURE — 99213 OFFICE O/P EST LOW 20 MIN: CPT | Performed by: FAMILY MEDICINE

## 2020-01-24 RX ORDER — CEFDINIR 300 MG/1
300 CAPSULE ORAL 2 TIMES DAILY
Qty: 20 CAPSULE | Refills: 0 | Status: SHIPPED | OUTPATIENT
Start: 2020-01-24 | End: 2020-02-11

## 2020-01-24 ASSESSMENT — MIFFLIN-ST. JEOR: SCORE: 1978.38

## 2020-01-24 NOTE — TELEPHONE ENCOUNTER
Reason for Call:  Other call back    Detailed comments: Pt was recently treated for phenomena like symptoms and he feels like his symptoms today are getting worse.     Phone Number Patient can be reached at: Home number on file [phone] (home)    Best Time: anytime    Can we leave a detailed message on this number? YES    Call taken on 1/24/2020 at 8:55 AM by Anuja Calles

## 2020-01-24 NOTE — PROGRESS NOTES
"Subjective     Pb Vincent is a 59 year old male who presents to clinic today for the following health issues:    HPI   ED/UC Followup:    Facility: [facility]  Date of visit: 1/19/2020  Reason for visit: Cough  Current Status: felt like it was getting better but woke up around 4 this morning feeling \"crappy\". Has a headache, dizzy, ears discomfort, and coughing again. Feels like it went back to how it was when symptoms first started.     The cough initially felt like it was improving but then the symptoms of congestion, drainage and has had some ear symptoms and headache. No fever, no shortness of breath.     Reviewed and updated as needed this visit by Provider         Review of Systems   No other const, resp symptoms.         Objective    /82 (BP Location: Right arm, Patient Position: Sitting, Cuff Size: Adult Large)   Pulse 109   Temp 98.6  F (37  C) (Oral)   Ht 1.803 m (5' 11\")   Wt 114.1 kg (251 lb 9.6 oz)   SpO2 95%   BMI 35.09 kg/m     Body mass index is 35.09 kg/m .  Physical Exam   Exam:  GENERAL APPEARANCE: healthy, alert and no distress  EYES: Eyes grossly normal to inspection  HENT: ear canals and TM's normal, nose and mouth without ulcers or lesions and maxillary sinus tenderness bilateral  NECK: no adenopathy, no asymmetry, masses, or scars and thyroid normal to palpation  RESP: lungs clear to auscultation - no rales, rhonchi or wheezes  CV: regular rates and rhythm, normal S1 S2, no S3 or S4 and no murmur, click or rub -     Diagnostic Test Results:  Labs reviewed in Epic        Assessment & Plan     1. Acute sinusitis with symptoms > 10 days  Given duration, will add in cefdinir. follow up if the symptoms are not resovling over the coming 7-10 days.   - cefdinir (OMNICEF) 300 MG capsule; Take 1 capsule (300 mg) by mouth 2 times daily  Dispense: 20 capsule; Refill: 0         Return in about 1 week (around 1/31/2020), or if symptoms worsen or fail to improve.    Jj" Keon Monreal MD  [facility]

## 2020-02-11 ENCOUNTER — OFFICE VISIT (OUTPATIENT)
Dept: FAMILY MEDICINE | Facility: CLINIC | Age: 60
End: 2020-02-11
Payer: COMMERCIAL

## 2020-02-11 VITALS
SYSTOLIC BLOOD PRESSURE: 118 MMHG | TEMPERATURE: 98 F | HEIGHT: 71 IN | BODY MASS INDEX: 33.91 KG/M2 | HEART RATE: 115 BPM | DIASTOLIC BLOOD PRESSURE: 76 MMHG | OXYGEN SATURATION: 95 % | WEIGHT: 242.2 LBS

## 2020-02-11 DIAGNOSIS — F41.9 ANXIETY: ICD-10-CM

## 2020-02-11 DIAGNOSIS — R09.81 NASAL CONGESTION: Primary | ICD-10-CM

## 2020-02-11 PROCEDURE — 99213 OFFICE O/P EST LOW 20 MIN: CPT | Performed by: FAMILY MEDICINE

## 2020-02-11 RX ORDER — PAROXETINE 10 MG/1
20 TABLET, FILM COATED ORAL EVERY MORNING
Qty: 90 TABLET | Refills: 3 | Status: SHIPPED | OUTPATIENT
Start: 2020-02-11 | End: 2020-02-12

## 2020-02-11 RX ORDER — FLUTICASONE PROPIONATE 50 MCG
1-2 SPRAY, SUSPENSION (ML) NASAL DAILY
Qty: 16 G | Refills: 11 | Status: SHIPPED | OUTPATIENT
Start: 2020-02-11

## 2020-02-11 ASSESSMENT — ANXIETY QUESTIONNAIRES
IF YOU CHECKED OFF ANY PROBLEMS ON THIS QUESTIONNAIRE, HOW DIFFICULT HAVE THESE PROBLEMS MADE IT FOR YOU TO DO YOUR WORK, TAKE CARE OF THINGS AT HOME, OR GET ALONG WITH OTHER PEOPLE: NOT DIFFICULT AT ALL
1. FEELING NERVOUS, ANXIOUS, OR ON EDGE: NOT AT ALL
3. WORRYING TOO MUCH ABOUT DIFFERENT THINGS: NOT AT ALL
GAD7 TOTAL SCORE: 1
7. FEELING AFRAID AS IF SOMETHING AWFUL MIGHT HAPPEN: NOT AT ALL
6. BECOMING EASILY ANNOYED OR IRRITABLE: SEVERAL DAYS
5. BEING SO RESTLESS THAT IT IS HARD TO SIT STILL: NOT AT ALL
2. NOT BEING ABLE TO STOP OR CONTROL WORRYING: NOT AT ALL

## 2020-02-11 ASSESSMENT — PATIENT HEALTH QUESTIONNAIRE - PHQ9: 5. POOR APPETITE OR OVEREATING: NOT AT ALL

## 2020-02-11 ASSESSMENT — MIFFLIN-ST. JEOR: SCORE: 1927.8

## 2020-02-11 NOTE — PROGRESS NOTES
Subjective     Pb Vincent is a 59 year old male who presents to clinic today for the following health issues:    HPI   RESPIRATORY SYMPTOMS       Duration: December 2019 got better than came back on Saturday     Description  nasal congestion, rhinorrhea, facial pain/pressure, cough, ear pain bilateral, headache and fatigue/malaise    Severity: moderate    Accompanying signs and symptoms: None    History (predisposing factors):  Past sinus infection      Therapies tried and outcome:  none    Was recently treated with 10 days of cefdinir for suspected persistent sinus infections. This seemed to help quite a bit. The sinus pressure is gone. The ear symptoms have resolved with the congestion. He continues to have some post nasal drip and cough but is clear. No fever. No fatigue. No trouble breathing.     Reviewed and updated as needed this visit by Provider         Review of Systems         Objective    There were no vitals taken for this visit.  There is no height or weight on file to calculate BMI.  Physical Exam   GENERAL: healthy, alert and no distress  EYES: Eyes grossly normal to inspection  HENT: ear canals and TM's normal, nose and mouth without ulcers or lesions  NECK: no adenopathy, no asymmetry, masses, or scars and thyroid normal to palpation  RESP: lungs clear to auscultation - no rales, rhonchi or wheezes  CV: regular rate and rhythm, normal S1 S2, no S3 or S4, no murmur, click or rub, no peripheral edema and peripheral pulses strong  MS: no gross musculoskeletal defects noted, no edema    Diagnostic Test Results:  none         Assessment & Plan     1. Nasal congestion  likely has some residual post infectious inflammation. No sign at present of  recurrence of the sinus infection. If symptoms worsen will consider treating with 14-20 days of cefdinir. Use of OTC  meds. discussed   - fluticasone (FLONASE) 50 MCG/ACT nasal spray; Spray 1-2 sprays into both nostrils daily  Dispense: 16 g; Refill:  11    2. Anxiety  We discussed continuing a slow wean down on paxil. He has been doing very well on 15 mg daily for a number of months. Will try lowering to 10mg daily. follow up if discontinuation effects occur.   - PARoxetine (PAXIL) 10 MG tablet; Take 2 tablets (20 mg) by mouth every morning  Dispense: 90 tablet; Refill: 3     Return in about 6 months (around 8/11/2020) for Physical Exam.    Jj Monreal MD  [facility]

## 2020-02-12 RX ORDER — PAROXETINE 10 MG/1
10 TABLET, FILM COATED ORAL EVERY MORNING
Qty: 90 TABLET | Refills: 3 | Status: SHIPPED | OUTPATIENT
Start: 2020-02-12 | End: 2021-02-05

## 2020-02-12 ASSESSMENT — ANXIETY QUESTIONNAIRES: GAD7 TOTAL SCORE: 1

## 2020-03-12 ENCOUNTER — E-VISIT (OUTPATIENT)
Dept: FAMILY MEDICINE | Facility: CLINIC | Age: 60
End: 2020-03-12
Payer: COMMERCIAL

## 2020-03-12 DIAGNOSIS — J32.9 SINUSITIS, UNSPECIFIED CHRONICITY, UNSPECIFIED LOCATION: Primary | ICD-10-CM

## 2020-03-12 PROCEDURE — 99207 ZZC NO BILLABLE SERVICE THIS VISIT: CPT | Performed by: FAMILY MEDICINE

## 2020-03-12 RX ORDER — CEFDINIR 300 MG/1
300 CAPSULE ORAL 2 TIMES DAILY
Qty: 28 CAPSULE | Refills: 0 | Status: SHIPPED | OUTPATIENT
Start: 2020-03-12

## 2020-11-16 ENCOUNTER — HEALTH MAINTENANCE LETTER (OUTPATIENT)
Age: 60
End: 2020-11-16

## 2021-02-05 DIAGNOSIS — F41.9 ANXIETY: ICD-10-CM

## 2021-02-05 RX ORDER — PAROXETINE 10 MG/1
10 TABLET, FILM COATED ORAL EVERY MORNING
Qty: 30 TABLET | Refills: 0 | Status: SHIPPED | OUTPATIENT
Start: 2021-02-05

## 2021-02-05 NOTE — TELEPHONE ENCOUNTER
Medication is being filled for 1 time refill only due to:  Patient needs to be seen because it has been more than one year since last visit. Needs to establish care with new provider. Please call and assist in an appointment. Sheila Gamez RN

## 2021-09-18 ENCOUNTER — HEALTH MAINTENANCE LETTER (OUTPATIENT)
Age: 61
End: 2021-09-18

## 2022-01-08 ENCOUNTER — HEALTH MAINTENANCE LETTER (OUTPATIENT)
Age: 62
End: 2022-01-08

## 2022-11-20 ENCOUNTER — HEALTH MAINTENANCE LETTER (OUTPATIENT)
Age: 62
End: 2022-11-20

## 2023-04-15 ENCOUNTER — HEALTH MAINTENANCE LETTER (OUTPATIENT)
Age: 63
End: 2023-04-15